# Patient Record
Sex: MALE | Race: WHITE | Employment: FULL TIME | URBAN - METROPOLITAN AREA
[De-identification: names, ages, dates, MRNs, and addresses within clinical notes are randomized per-mention and may not be internally consistent; named-entity substitution may affect disease eponyms.]

---

## 2017-05-16 ENCOUNTER — HOSPITAL ENCOUNTER (OUTPATIENT)
Dept: CT IMAGING | Facility: HOSPITAL | Age: 52
Discharge: HOME | End: 2017-05-16
Attending: INTERNAL MEDICINE

## 2017-05-16 DIAGNOSIS — Z13.9 SCREENING PROCEDURE: ICD-10-CM

## 2017-05-19 DIAGNOSIS — E78.1 HYPERTRIGLYCERIDEMIA: Primary | ICD-10-CM

## 2018-04-12 ENCOUNTER — TELEPHONE (OUTPATIENT)
Dept: INTERNAL MEDICINE CLINIC | Facility: CLINIC | Age: 53
End: 2018-04-12

## 2018-04-12 NOTE — TELEPHONE ENCOUNTER
Patient's spouse called (who is listed on patients consent form dated: 2014), patient's wife stated that the patient was not feeling well, he was getting ready to travel for a work meeting as well.  (Patients wife noticed he did not look right to her either

## 2018-04-12 NOTE — TELEPHONE ENCOUNTER
Called the patient's wife, Aria Ozuna, back (okay per HIPAA) at [phone]. She stated that the patient was in [place] on a business trip when he started getting chest pain. His BP was 215/110.  He went to the closest ER and was admitted on 04/07/18

## 2018-04-13 NOTE — TELEPHONE ENCOUNTER
Incoming (mail or fax):  fax  Received from:  [facility]  Documentation given to:  triage

## 2018-04-13 NOTE — TELEPHONE ENCOUNTER
Fax received back for Jignesh Nelson 949 signed release is required. Called the patient's wife, Avery Falcon ([phone]) and informed her the patient needs to sign a release of info. She said she would have her  stop in today before close.  Once signed

## 2018-04-13 NOTE — TELEPHONE ENCOUNTER
Patient came in and signed consent for release of information form. Faxed back to [facility] for Medical Records to be faxed. Signed release form placed in Triage Pending bin.

## 2018-04-13 NOTE — TELEPHONE ENCOUNTER
Received the patient's medical records from [facility]. In folder for review. Patient has Hospital follow up on 04/19/18 at 1:00 with Ankur Rice. The patient was in [place] on a business trip when he started getting chest pain. His BP was 215/110.

## 2018-04-19 ENCOUNTER — OFFICE VISIT (OUTPATIENT)
Dept: INTERNAL MEDICINE CLINIC | Facility: CLINIC | Age: 53
End: 2018-04-19

## 2018-04-19 VITALS
HEART RATE: 80 BPM | TEMPERATURE: 98 F | WEIGHT: 184.19 LBS | RESPIRATION RATE: 12 BRPM | SYSTOLIC BLOOD PRESSURE: 120 MMHG | DIASTOLIC BLOOD PRESSURE: 78 MMHG | BODY MASS INDEX: 28.57 KG/M2 | HEIGHT: 67.5 IN

## 2018-04-19 DIAGNOSIS — Z95.5 S/P PRIMARY ANGIOPLASTY WITH CORONARY STENT: ICD-10-CM

## 2018-04-19 DIAGNOSIS — Z95.5 STATUS POST INSERTION OF DRUG ELUTING CORONARY ARTERY STENT: ICD-10-CM

## 2018-04-19 DIAGNOSIS — E78.5 DYSLIPIDEMIA: ICD-10-CM

## 2018-04-19 DIAGNOSIS — R07.9 CHEST PAIN, UNSPECIFIED TYPE: Primary | ICD-10-CM

## 2018-04-19 DIAGNOSIS — I10 ESSENTIAL HYPERTENSION: ICD-10-CM

## 2018-04-19 PROCEDURE — 1111F DSCHRG MED/CURRENT MED MERGE: CPT | Performed by: NURSE PRACTITIONER

## 2018-04-19 PROCEDURE — 99204 OFFICE O/P NEW MOD 45 MIN: CPT | Performed by: NURSE PRACTITIONER

## 2018-04-19 RX ORDER — NITROGLYCERIN 0.4 MG/1
0.4 TABLET SUBLINGUAL EVERY 5 MIN PRN
COMMUNITY
Start: 2018-04-09 | End: 2018-05-25 | Stop reason: ALTCHOICE

## 2018-04-19 RX ORDER — PRASUGREL 10 MG/1
10 TABLET, FILM COATED ORAL DAILY
COMMUNITY
Start: 2018-04-10 | End: 2018-04-27

## 2018-04-19 RX ORDER — LISINOPRIL 5 MG/1
5 TABLET ORAL DAILY
COMMUNITY
Start: 2018-04-10 | End: 2018-04-19

## 2018-04-19 RX ORDER — ASPIRIN 81 MG/1
81 TABLET, CHEWABLE ORAL DAILY
COMMUNITY
Start: 2018-04-10 | End: 2018-07-25 | Stop reason: ALTCHOICE

## 2018-04-19 RX ORDER — ATORVASTATIN CALCIUM 80 MG/1
80 TABLET, FILM COATED ORAL NIGHTLY
COMMUNITY
Start: 2018-04-09 | End: 2018-04-19

## 2018-04-19 RX ORDER — HYDROCHLOROTHIAZIDE 25 MG/1
25 TABLET ORAL DAILY
Qty: 90 TABLET | Refills: 1 | Status: SHIPPED | OUTPATIENT
Start: 2018-04-19 | End: 2018-05-25 | Stop reason: ALTCHOICE

## 2018-04-19 RX ORDER — LISINOPRIL 5 MG/1
5 TABLET ORAL DAILY
Qty: 90 TABLET | Refills: 1 | Status: SHIPPED | OUTPATIENT
Start: 2018-04-19 | End: 2018-10-01

## 2018-04-19 RX ORDER — ROSUVASTATIN CALCIUM 20 MG/1
20 TABLET, COATED ORAL NIGHTLY
Qty: 30 TABLET | Refills: 1 | Status: SHIPPED | OUTPATIENT
Start: 2018-04-19 | End: 2018-05-18

## 2018-04-19 RX ORDER — HYDROCHLOROTHIAZIDE 25 MG/1
25 TABLET ORAL DAILY
COMMUNITY
Start: 2018-04-10 | End: 2018-04-19

## 2018-04-19 NOTE — PROGRESS NOTES
Lilliam Bentley is a 46year old male who presents as a new patient to establish. HPI:   Pt presents for follow up of hospitalization in [place] for chest pain with activity.  While there had angiography with placement of stent in RCA with [facility] total) by mouth daily. Disp: 90 tablet Rfl: 1   lisinopril 5 MG Oral Tab Take 1 tablet (5 mg total) by mouth daily. Disp: 90 tablet Rfl: 1   metoprolol Tartrate 25 MG Oral Tab Take 1 tablet (25 mg total) by mouth 2 (two) times daily.  Disp: 90 tablet Rfl: 1 closely and watches calories closely  Smoker:  No    Cessation Advised:  No  Alcohol Use:  yes      Concerns:  no     Health Maintenance  Immunizations: Recommend flu vaccine for this season,     Immunization History  Administered            Date(s) Emilee Mitchell appropriately. ASSESSMENT AND PLAN:         Chest pain, unspecified type  (primary encounter diagnosis)- resolved s/p intervention. Status post insertion of drug eluting coronary artery stent- referred to cardiology- Dr. Maulik Smith for follow up.  Willie Villarreal

## 2018-04-23 ENCOUNTER — OFFICE VISIT (OUTPATIENT)
Dept: INTERNAL MEDICINE CLINIC | Facility: CLINIC | Age: 53
End: 2018-04-23

## 2018-04-23 ENCOUNTER — TELEPHONE (OUTPATIENT)
Dept: INTERNAL MEDICINE CLINIC | Facility: CLINIC | Age: 53
End: 2018-04-23

## 2018-04-23 VITALS
HEIGHT: 67.5 IN | BODY MASS INDEX: 28.54 KG/M2 | TEMPERATURE: 98 F | HEART RATE: 83 BPM | RESPIRATION RATE: 14 BRPM | SYSTOLIC BLOOD PRESSURE: 116 MMHG | DIASTOLIC BLOOD PRESSURE: 86 MMHG | OXYGEN SATURATION: 98 % | WEIGHT: 184 LBS

## 2018-04-23 DIAGNOSIS — M10.271 ACUTE DRUG-INDUCED GOUT OF RIGHT FOOT: Primary | ICD-10-CM

## 2018-04-23 PROCEDURE — 99213 OFFICE O/P EST LOW 20 MIN: CPT | Performed by: INTERNAL MEDICINE

## 2018-04-23 RX ORDER — METHYLPREDNISOLONE 4 MG/1
TABLET ORAL
Qty: 1 KIT | Refills: 0 | Status: SHIPPED | OUTPATIENT
Start: 2018-04-23 | End: 2018-05-25 | Stop reason: ALTCHOICE

## 2018-04-23 RX ORDER — COLCHICINE 0.6 MG/1
0.6 TABLET ORAL 2 TIMES DAILY
Qty: 30 TABLET | Refills: 0 | Status: CANCELLED | OUTPATIENT
Start: 2018-04-23

## 2018-04-23 NOTE — PATIENT INSTRUCTIONS
Gout Diet  Gout is a painful condition caused by an excess of uric acid, a waste product made by the body. Uric acid forms crystals that collect in the joints. The immune response to these crystals brings on symptoms of joint pain and swelling.  This is c · Dairy products that are low-fat or fat-free, such as cheese and yogurt  · Complex carbohydrate foods, including whole grains, brown rice, oats, and beans  · Coffee, in moderation  · Water, approximately 64 ounces per day  Follow-up care  Follow up with nick · Certain fish (anchovies, sardines, fish roes, herring, tuna, mussels, codfish, scallops, trout, and eva)  · Certain meats (red meat, processed meat, guevara, turkey, wild game, and goose)  · Sauces and gravies made with meat  · Organ meats (such as cassia

## 2018-04-23 NOTE — PROGRESS NOTES
Molly Ko is a 46year old male  Patient presents with: Foot Pain: Right Foot  Heel Pain: Right Foot      HPI:   Pt called today with following c/o:     Spoke with pt.  Over wkend, acute onset of right foot pain, heel with \"sharp, shooting\" pain, 1 Status post insertion of drug eluting coronary artery stent     Essential hypertension     Dyslipidemia     Social History:  Smoking status: Never Smoker                                                              Smokeless tobacco: Never Used Gout is a painful condition caused by an excess of uric acid, a waste product made by the body. Uric acid forms crystals that collect in the joints. The immune response to these crystals brings on symptoms of joint pain and swelling.  This is called a gout · Complex carbohydrate foods, including whole grains, brown rice, oats, and beans  · Coffee, in moderation  · Water, approximately 64 ounces per day  Follow-up care  Follow up with your healthcare provider as advised.   When to seek medical advice  Call you · Certain meats (red meat, processed meat, guevara, turkey, wild game, and goose)  · Sauces and gravies made with meat  · Organ meats (such as liver, kidneys, sweetbreads, and tripe)  · Legumes (such as dried beans and peas)  · Mushrooms, spinach, asparagus,

## 2018-04-23 NOTE — TELEPHONE ENCOUNTER
Spoke with pt. Over wkend, acute onset of right foot pain, heel with \"sharp, shooting\" pain, 10/10 if tries to put any pressure on that foot, cannot bare weight on that foot. Top of foot swollen and red. Keeping him awake at night.  Took Tylenol at [employer]

## 2018-04-23 NOTE — TELEPHONE ENCOUNTER
Patient said he is having right foot pain, a little swollen on the top of the foot and as the patient walks he gets shooting pain in the heal of the foot. This pain started a little bit last week, and last night it got more severe.  Patient said he thinks m

## 2018-04-25 ENCOUNTER — TELEPHONE (OUTPATIENT)
Dept: INTERNAL MEDICINE CLINIC | Facility: CLINIC | Age: 53
End: 2018-04-25

## 2018-04-25 NOTE — TELEPHONE ENCOUNTER
LM for the patient to call us back. Please inform him of the below message from Dr. Monika Jasso. She does not believe his heel pain is consistent with gout, and feels he needs to see a podiatrist. She is referring him to:    Dr. Iman Fields  [phone] KAHTIE Gonzales

## 2018-04-25 NOTE — TELEPHONE ENCOUNTER
I think he should see a podiatrist, the heel pain is not as consistent with gout- refer to Dr Contreras Moreno please  He can also take ES tylenol 1000 mg every 6h prn pain  Could be just a bad case of plantar fasciitis but not typical to be so painful as his- can

## 2018-04-25 NOTE — TELEPHONE ENCOUNTER
LM for the patient to call back. Was seen by Dr. Jesus Galloway for drug-induced gout of his right foot on 04/23/18. Did not notice in message below if he's been taking the Medrol (methylprednisolone) that she prescribed, which should help with the inflammation.  Ne

## 2018-04-25 NOTE — TELEPHONE ENCOUNTER
Pt saw Dr Phebe Brittle for foot pain, states that pain is no better, would like to know if he can take tylenol or anything else for pain. Please advise.  Thank you

## 2018-04-25 NOTE — TELEPHONE ENCOUNTER
The patient called back. He stated that he has been taking the Medrol since Monday (04/23) and while it has helped with the inflammation and pain on the top of his foot, he remains with severe pain in his heel that is making it difficult for him to walk.  Kermit Goldberg

## 2018-04-30 ENCOUNTER — TELEPHONE (OUTPATIENT)
Dept: INTERNAL MEDICINE CLINIC | Facility: CLINIC | Age: 53
End: 2018-04-30

## 2018-05-18 NOTE — TELEPHONE ENCOUNTER
From: Lilliam Bentley  Sent: 5/18/2018 8:01 AM CDT  Subject: Medication Renewal Request    Lilliam Bentley would like a refill of the following medications:     Rosuvastatin Calcium 20 MG Oral Tab [Joshua Grant NP]    Preferred pharmacy: [facility] #[id number]

## 2018-05-21 RX ORDER — ROSUVASTATIN CALCIUM 20 MG/1
20 TABLET, COATED ORAL NIGHTLY
Qty: 30 TABLET | Refills: 1 | Status: SHIPPED | OUTPATIENT
Start: 2018-05-21 | End: 2018-06-21

## 2018-05-25 ENCOUNTER — OFFICE VISIT (OUTPATIENT)
Dept: INTERNAL MEDICINE CLINIC | Facility: CLINIC | Age: 53
End: 2018-05-25

## 2018-05-25 VITALS
BODY MASS INDEX: 25.97 KG/M2 | OXYGEN SATURATION: 98 % | HEIGHT: 69 IN | SYSTOLIC BLOOD PRESSURE: 124 MMHG | WEIGHT: 175.31 LBS | HEART RATE: 93 BPM | DIASTOLIC BLOOD PRESSURE: 80 MMHG | TEMPERATURE: 98 F | RESPIRATION RATE: 16 BRPM

## 2018-05-25 DIAGNOSIS — K13.79 UVULAR SWELLING: Primary | ICD-10-CM

## 2018-05-25 PROCEDURE — 99213 OFFICE O/P EST LOW 20 MIN: CPT | Performed by: INTERNAL MEDICINE

## 2018-05-25 PROCEDURE — 87081 CULTURE SCREEN ONLY: CPT | Performed by: INTERNAL MEDICINE

## 2018-05-25 PROCEDURE — 87880 STREP A ASSAY W/OPTIC: CPT | Performed by: INTERNAL MEDICINE

## 2018-05-25 NOTE — PATIENT INSTRUCTIONS
Gargle with salt water, 1/2 tsp salt in 1/2 cup warm water, gargle and spit four times daily.  Take tylenol for pain

## 2018-05-25 NOTE — PROGRESS NOTES
Piter Moreno is a 46year old male  Patient presents with:  Sore Throat: dry, no problems in swallowing, back of throat red      HPI:   2 nights ago dry throat, long and red uvula  Since then a little sore to swallow  Still feels something in back of th normocephalic,ears  Clear. Throat: he has uvular redness, mild swelling and white tip, pinpoint exudates tonsils, no erythema.  No stridor or drooling  NECK: supple,no adenopathy,no bruits, no TM  a      ASSESSMENT AND PLAN:   Uvular swelling  (primary enco

## 2018-05-30 ENCOUNTER — TELEPHONE (OUTPATIENT)
Dept: INTERNAL MEDICINE CLINIC | Facility: CLINIC | Age: 53
End: 2018-05-30

## 2018-05-30 NOTE — TELEPHONE ENCOUNTER
Pt wife states that he is not getting any relief from his gout pain, was wondering if he could start to take tumeric. Please advise.  Thank you

## 2018-05-30 NOTE — TELEPHONE ENCOUNTER
The patient's wife called, Alberto Montoya, who is on HIPAA about the patient's gout. She is asking about the safety of OTC tumeric. She wants to know, specifically, if it would interact with any of his medications.  She stated that the cardiologist took him of the

## 2018-05-30 NOTE — TELEPHONE ENCOUNTER
Called Jose Guadalupe Rivera back, the patient's wife, and informed her that Dr. Maylin Wild stated that any stopping of medication during an acute gout attack could make it worse, but taking turmeric OTC would be okay.  The patient's wife verbalized understanding and will pass

## 2018-05-30 NOTE — TELEPHONE ENCOUNTER
What are his \"gout symptoms\"?  Should have been an acute bout that should have resolved  Turmeric is OK  I would not have stopped the HCTZ during an attack and I think I discussed this w/them when he came in for the acute attack, any adjustment in the [facility]

## 2018-06-14 RX ORDER — ROSUVASTATIN CALCIUM 20 MG/1
20 TABLET, COATED ORAL NIGHTLY
Qty: 30 TABLET | Refills: 0 | Status: SHIPPED | OUTPATIENT
Start: 2018-06-14 | End: 2018-06-21

## 2018-06-14 NOTE — TELEPHONE ENCOUNTER
Last OV relevant to medication: 4/19/18  Last refill date: 5/21/18   #30/refills: 1  When pt was asked to return for OV: 3 months  Upcoming appt/reason: 7/25/18    Lab Results  Component Value Date    (H) 11/12/2013   BUN 22 (H) 11/12/2013   CARA

## 2018-06-15 ENCOUNTER — TELEPHONE (OUTPATIENT)
Dept: INTERNAL MEDICINE CLINIC | Facility: CLINIC | Age: 53
End: 2018-06-15

## 2018-06-15 NOTE — TELEPHONE ENCOUNTER
Patient is leaving for vacation today. He believes he is having a gout attack. His left ankle is swollen and he has been in pain since Tuesday night. Tylenol every 6 hours is not working. He said he is not able to take ibuprofen due to blood thinners.

## 2018-06-21 ENCOUNTER — TELEPHONE (OUTPATIENT)
Dept: INTERNAL MEDICINE CLINIC | Facility: CLINIC | Age: 53
End: 2018-06-21

## 2018-06-21 RX ORDER — COLCHICINE 0.6 MG/1
0.6 TABLET ORAL DAILY
Qty: 45 TABLET | Refills: 0 | Status: SHIPPED | OUTPATIENT
Start: 2018-06-21 | End: 2018-07-25 | Stop reason: ALTCHOICE

## 2018-06-21 RX ORDER — ROSUVASTATIN CALCIUM 20 MG/1
20 TABLET, COATED ORAL NIGHTLY
Qty: 90 TABLET | Refills: 0 | Status: SHIPPED | OUTPATIENT
Start: 2018-06-21 | End: 2018-09-04

## 2018-06-21 NOTE — TELEPHONE ENCOUNTER
Noted below, and informed the patient to keep his appointment with Dr. Mya Gonzalez on 07/25/18. The patient verbalized understanding.

## 2018-06-21 NOTE — TELEPHONE ENCOUNTER
Bobby Hoyt from [facility] called and stated that pts insurance will only cover a 90 day RX for his medication. Please send 90 RX     ROSUVASTATIN CALCIUM 20 MG Oral Tab 30 tablet 0 6/14/2018    Sig :  TAKE 1 TABLET (20 MG TOTAL) BY MOUTH NIGHTLY.      Route:   Or

## 2018-06-21 NOTE — TELEPHONE ENCOUNTER
The patient called stating that he has had a gout flare up on his left foot for around 2 months. The patient stated the gout attack started in his right foot, and the swelling and pain went away after 2 months.  However, he is now having a gout flare up on

## 2018-06-21 NOTE — TELEPHONE ENCOUNTER
colcrys 1.2 mg po then 1 h later 0.6 mg po  Next day start 0.6 mg daily and stay on for 6 weeks, check uric acid in 6 weeks  Call if muscle soreness or dark urine due to being on crestor with it  Uric acid level won't be helpful during acute attack and can

## 2018-06-21 NOTE — TELEPHONE ENCOUNTER
Spoke to the patient and instructed him to take two (2) 0.6 mg tablets initially, then decrease to one (1) 0.6 mg tablet 1 hour later, then take 0.6 mg daily for 6 weeks per Dr. Jesus Galloway.  Also informed him that Dr. Jesus Galloway wants him to get his uric acid level w

## 2018-07-06 ENCOUNTER — CARDPULM VISIT (OUTPATIENT)
Dept: CARDIAC REHAB | Facility: HOSPITAL | Age: 53
End: 2018-07-06
Attending: INTERNAL MEDICINE
Payer: COMMERCIAL

## 2018-07-09 ENCOUNTER — CARDPULM VISIT (OUTPATIENT)
Dept: CARDIAC REHAB | Facility: HOSPITAL | Age: 53
End: 2018-07-09
Attending: INTERNAL MEDICINE
Payer: COMMERCIAL

## 2018-07-09 PROCEDURE — 93798 PHYS/QHP OP CAR RHAB W/ECG: CPT

## 2018-07-10 ENCOUNTER — CARDPULM VISIT (OUTPATIENT)
Dept: CARDIAC REHAB | Facility: HOSPITAL | Age: 53
End: 2018-07-10
Attending: INTERNAL MEDICINE
Payer: COMMERCIAL

## 2018-07-10 PROCEDURE — 93798 PHYS/QHP OP CAR RHAB W/ECG: CPT

## 2018-07-11 ENCOUNTER — APPOINTMENT (OUTPATIENT)
Dept: CARDIAC REHAB | Facility: HOSPITAL | Age: 53
End: 2018-07-11
Attending: INTERNAL MEDICINE
Payer: COMMERCIAL

## 2018-07-13 ENCOUNTER — CARDPULM VISIT (OUTPATIENT)
Dept: CARDIAC REHAB | Facility: HOSPITAL | Age: 53
End: 2018-07-13
Attending: INTERNAL MEDICINE
Payer: COMMERCIAL

## 2018-07-13 PROCEDURE — 93798 PHYS/QHP OP CAR RHAB W/ECG: CPT

## 2018-07-16 ENCOUNTER — APPOINTMENT (OUTPATIENT)
Dept: CARDIAC REHAB | Facility: HOSPITAL | Age: 53
End: 2018-07-16
Attending: INTERNAL MEDICINE
Payer: COMMERCIAL

## 2018-07-18 ENCOUNTER — APPOINTMENT (OUTPATIENT)
Dept: CARDIAC REHAB | Facility: HOSPITAL | Age: 53
End: 2018-07-18
Attending: INTERNAL MEDICINE
Payer: COMMERCIAL

## 2018-07-20 ENCOUNTER — APPOINTMENT (OUTPATIENT)
Dept: CARDIAC REHAB | Facility: HOSPITAL | Age: 53
End: 2018-07-20
Attending: INTERNAL MEDICINE
Payer: COMMERCIAL

## 2018-07-23 ENCOUNTER — PATIENT MESSAGE (OUTPATIENT)
Dept: INTERNAL MEDICINE CLINIC | Facility: CLINIC | Age: 53
End: 2018-07-23

## 2018-07-23 ENCOUNTER — CARDPULM VISIT (OUTPATIENT)
Dept: CARDIAC REHAB | Facility: HOSPITAL | Age: 53
End: 2018-07-23
Attending: INTERNAL MEDICINE
Payer: COMMERCIAL

## 2018-07-23 PROCEDURE — 93798 PHYS/QHP OP CAR RHAB W/ECG: CPT

## 2018-07-24 ENCOUNTER — LAB ENCOUNTER (OUTPATIENT)
Dept: LAB | Age: 53
End: 2018-07-24
Attending: NURSE PRACTITIONER
Payer: COMMERCIAL

## 2018-07-24 DIAGNOSIS — Z95.5 S/P PRIMARY ANGIOPLASTY WITH CORONARY STENT: ICD-10-CM

## 2018-07-24 DIAGNOSIS — M10.271 ACUTE DRUG-INDUCED GOUT OF RIGHT FOOT: ICD-10-CM

## 2018-07-24 DIAGNOSIS — I10 ESSENTIAL HYPERTENSION: ICD-10-CM

## 2018-07-24 DIAGNOSIS — E78.5 DYSLIPIDEMIA: ICD-10-CM

## 2018-07-24 LAB
ALBUMIN SERPL-MCNC: 4.2 G/DL (ref 3.5–4.8)
ALBUMIN/GLOB SERPL: 1.6 {RATIO} (ref 1–2)
ALP LIVER SERPL-CCNC: 59 U/L (ref 45–117)
ALT SERPL-CCNC: 31 U/L (ref 17–63)
ANION GAP SERPL CALC-SCNC: 7 MMOL/L (ref 0–18)
AST SERPL-CCNC: 12 U/L (ref 15–41)
BILIRUB SERPL-MCNC: 1.5 MG/DL (ref 0.1–2)
BUN BLD-MCNC: 18 MG/DL (ref 8–20)
BUN/CREAT SERPL: 17 (ref 10–20)
CALCIUM BLD-MCNC: 8.8 MG/DL (ref 8.3–10.3)
CHLORIDE SERPL-SCNC: 106 MMOL/L (ref 101–111)
CHOLEST SMN-MCNC: 138 MG/DL (ref ?–200)
CO2 SERPL-SCNC: 30 MMOL/L (ref 22–32)
CREAT BLD-MCNC: 1.06 MG/DL (ref 0.7–1.3)
GLOBULIN PLAS-MCNC: 2.6 G/DL (ref 2.5–3.7)
GLUCOSE BLD-MCNC: 127 MG/DL (ref 70–99)
HDLC SERPL-MCNC: 38 MG/DL (ref 40–59)
LDLC SERPL CALC-MCNC: 41 MG/DL (ref ?–100)
M PROTEIN MFR SERPL ELPH: 6.8 G/DL (ref 6.1–8.3)
NONHDLC SERPL-MCNC: 100 MG/DL (ref ?–130)
OSMOLALITY SERPL CALC.SUM OF ELEC: 299 MOSM/KG (ref 275–295)
POTASSIUM SERPL-SCNC: 4.1 MMOL/L (ref 3.6–5.1)
SODIUM SERPL-SCNC: 143 MMOL/L (ref 136–144)
TRIGL SERPL-MCNC: 295 MG/DL (ref 30–149)
URATE SERPL-MCNC: 7.7 MG/DL (ref 2.4–8.7)
VLDLC SERPL CALC-MCNC: 59 MG/DL (ref 0–30)

## 2018-07-24 PROCEDURE — 80053 COMPREHEN METABOLIC PANEL: CPT | Performed by: NURSE PRACTITIONER

## 2018-07-24 PROCEDURE — 36415 COLL VENOUS BLD VENIPUNCTURE: CPT | Performed by: NURSE PRACTITIONER

## 2018-07-24 PROCEDURE — 84550 ASSAY OF BLOOD/URIC ACID: CPT | Performed by: INTERNAL MEDICINE

## 2018-07-24 PROCEDURE — 80061 LIPID PANEL: CPT | Performed by: NURSE PRACTITIONER

## 2018-07-24 NOTE — TELEPHONE ENCOUNTER
From: Severa Galla  Sent: 7/23/2018 7:43 PM CDT  Subject: Medication Renewal Request    Severa Galla would like a refill of the following medications:     colchicine (COLCRYS) 0.6 MG Oral Tab Quin Claude, MD]    Preferred pharmacy: [facility]

## 2018-07-24 NOTE — TELEPHONE ENCOUNTER
From: Harini Grover  To: Wilmar Lux NP  Sent: 7/23/2018 7:13 PM CDT  Subject: Visit Follow-up Question    Do i have an order for blood test this week?     Davis Simpson

## 2018-07-25 ENCOUNTER — APPOINTMENT (OUTPATIENT)
Dept: CARDIAC REHAB | Facility: HOSPITAL | Age: 53
End: 2018-07-25
Attending: INTERNAL MEDICINE
Payer: COMMERCIAL

## 2018-07-25 ENCOUNTER — OFFICE VISIT (OUTPATIENT)
Dept: INTERNAL MEDICINE CLINIC | Facility: CLINIC | Age: 53
End: 2018-07-25
Payer: COMMERCIAL

## 2018-07-25 VITALS
BODY MASS INDEX: 25.18 KG/M2 | RESPIRATION RATE: 14 BRPM | HEIGHT: 69 IN | TEMPERATURE: 98 F | DIASTOLIC BLOOD PRESSURE: 80 MMHG | SYSTOLIC BLOOD PRESSURE: 118 MMHG | OXYGEN SATURATION: 98 % | HEART RATE: 74 BPM | WEIGHT: 170 LBS

## 2018-07-25 DIAGNOSIS — R42 POSTURAL DIZZINESS: ICD-10-CM

## 2018-07-25 DIAGNOSIS — M79.672 PAIN OF LEFT HEEL: Primary | ICD-10-CM

## 2018-07-25 DIAGNOSIS — E78.1 HYPERTRIGLYCERIDEMIA: ICD-10-CM

## 2018-07-25 DIAGNOSIS — E74.39 GLUCOSE INTOLERANCE: ICD-10-CM

## 2018-07-25 DIAGNOSIS — M10.271 ACUTE DRUG-INDUCED GOUT OF RIGHT FOOT: ICD-10-CM

## 2018-07-25 PROCEDURE — 99214 OFFICE O/P EST MOD 30 MIN: CPT | Performed by: NURSE PRACTITIONER

## 2018-07-25 RX ORDER — COLCHICINE 0.6 MG/1
0.6 TABLET ORAL DAILY
Qty: 45 TABLET | Refills: 0
Start: 2018-07-25

## 2018-07-25 NOTE — PROGRESS NOTES
Patient presents with: Follow - Up: Gout  Heel Pain  Lightheadedness      HPI:  Presents with approx 2 day history of left heel pain. Stated seems worse after completing cardiac rehab exercises. Stated it is mild.  Denies redness, swelling or pain with marvin neuropathy 1/20/2010    hx anterior ischemic optic neuropathy   • Other dysfunctions of sleep stages or arousal from sleep 1/25/2010   • PAC (premature atrial contraction) 6/4/2010, 1/22/2010 1/22/2010: rare premature atrial contractions with one 5 beat left heel w/o erythema, edema, masses or obvious deformity. No TTP. Skin: Skin is warm and dry. No rash noted. No erythema. No pallor. A/P:    Pain of left heel  (primary encounter diagnosis)- continue to monitor.  Notify office if pain worsens, lamar

## 2018-07-25 NOTE — TELEPHONE ENCOUNTER
Patient was just in today for OV Follow Up, Tonio said Rx is no longer needed and to Refuse Refill. Rx Denied.

## 2018-07-27 ENCOUNTER — APPOINTMENT (OUTPATIENT)
Dept: CARDIAC REHAB | Facility: HOSPITAL | Age: 53
End: 2018-07-27
Attending: INTERNAL MEDICINE
Payer: COMMERCIAL

## 2018-07-27 PROCEDURE — 93798 PHYS/QHP OP CAR RHAB W/ECG: CPT

## 2018-07-30 ENCOUNTER — APPOINTMENT (OUTPATIENT)
Dept: CARDIAC REHAB | Facility: HOSPITAL | Age: 53
End: 2018-07-30
Attending: INTERNAL MEDICINE
Payer: COMMERCIAL

## 2018-07-30 PROCEDURE — 93798 PHYS/QHP OP CAR RHAB W/ECG: CPT

## 2018-08-01 ENCOUNTER — APPOINTMENT (OUTPATIENT)
Dept: CARDIAC REHAB | Facility: HOSPITAL | Age: 53
End: 2018-08-01
Attending: INTERNAL MEDICINE
Payer: COMMERCIAL

## 2018-08-01 RX ORDER — LISINOPRIL 5 MG/1
5 TABLET ORAL DAILY
Qty: 90 TABLET | Refills: 1 | OUTPATIENT
Start: 2018-08-01 | End: 2019-08-01

## 2018-08-01 NOTE — TELEPHONE ENCOUNTER
From: Hazel Bernard  Sent: 8/1/2018 1:13 PM CDT  Subject: Medication Renewal Request    Hazel Bernard would like a refill of the following medications:     lisinopril 5 MG Oral Tab [Joshua Grant NP]    Preferred pharmacy: [facility]/PHARMACY #[id number] - [address] - [address] [facility] [address]  [phone], [phone]    Comment:      Medication renewals requested in this message routed separately:     Prasugrel HCl 10 MG Oral Tab Silke Fung MD]

## 2018-08-03 ENCOUNTER — APPOINTMENT (OUTPATIENT)
Dept: CARDIAC REHAB | Facility: HOSPITAL | Age: 53
End: 2018-08-03
Attending: INTERNAL MEDICINE
Payer: COMMERCIAL

## 2018-08-03 PROCEDURE — 93798 PHYS/QHP OP CAR RHAB W/ECG: CPT

## 2018-08-06 ENCOUNTER — CARDPULM VISIT (OUTPATIENT)
Dept: CARDIAC REHAB | Facility: HOSPITAL | Age: 53
End: 2018-08-06
Attending: INTERNAL MEDICINE
Payer: COMMERCIAL

## 2018-08-06 PROCEDURE — 93798 PHYS/QHP OP CAR RHAB W/ECG: CPT

## 2018-08-08 ENCOUNTER — APPOINTMENT (OUTPATIENT)
Dept: CARDIAC REHAB | Facility: HOSPITAL | Age: 53
End: 2018-08-08
Attending: INTERNAL MEDICINE
Payer: COMMERCIAL

## 2018-08-10 ENCOUNTER — CARDPULM VISIT (OUTPATIENT)
Dept: CARDIAC REHAB | Facility: HOSPITAL | Age: 53
End: 2018-08-10
Attending: INTERNAL MEDICINE
Payer: COMMERCIAL

## 2018-08-10 PROCEDURE — 93798 PHYS/QHP OP CAR RHAB W/ECG: CPT

## 2018-08-13 ENCOUNTER — CARDPULM VISIT (OUTPATIENT)
Dept: CARDIAC REHAB | Facility: HOSPITAL | Age: 53
End: 2018-08-13
Attending: INTERNAL MEDICINE
Payer: COMMERCIAL

## 2018-08-13 PROCEDURE — 93798 PHYS/QHP OP CAR RHAB W/ECG: CPT

## 2018-08-15 ENCOUNTER — APPOINTMENT (OUTPATIENT)
Dept: CARDIAC REHAB | Facility: HOSPITAL | Age: 53
End: 2018-08-15
Attending: INTERNAL MEDICINE
Payer: COMMERCIAL

## 2018-08-17 ENCOUNTER — CARDPULM VISIT (OUTPATIENT)
Dept: CARDIAC REHAB | Facility: HOSPITAL | Age: 53
End: 2018-08-17
Attending: INTERNAL MEDICINE
Payer: COMMERCIAL

## 2018-08-17 PROCEDURE — 93798 PHYS/QHP OP CAR RHAB W/ECG: CPT

## 2018-08-20 ENCOUNTER — CARDPULM VISIT (OUTPATIENT)
Dept: CARDIAC REHAB | Facility: HOSPITAL | Age: 53
End: 2018-08-20
Attending: INTERNAL MEDICINE
Payer: COMMERCIAL

## 2018-08-20 PROCEDURE — 93798 PHYS/QHP OP CAR RHAB W/ECG: CPT

## 2018-08-22 ENCOUNTER — APPOINTMENT (OUTPATIENT)
Dept: CARDIAC REHAB | Facility: HOSPITAL | Age: 53
End: 2018-08-22
Attending: INTERNAL MEDICINE
Payer: COMMERCIAL

## 2018-08-24 ENCOUNTER — APPOINTMENT (OUTPATIENT)
Dept: CARDIAC REHAB | Facility: HOSPITAL | Age: 53
End: 2018-08-24
Attending: INTERNAL MEDICINE
Payer: COMMERCIAL

## 2018-08-27 ENCOUNTER — APPOINTMENT (OUTPATIENT)
Dept: CARDIAC REHAB | Facility: HOSPITAL | Age: 53
End: 2018-08-27
Attending: INTERNAL MEDICINE
Payer: COMMERCIAL

## 2018-08-27 PROCEDURE — 93798 PHYS/QHP OP CAR RHAB W/ECG: CPT

## 2018-08-29 ENCOUNTER — APPOINTMENT (OUTPATIENT)
Dept: CARDIAC REHAB | Facility: HOSPITAL | Age: 53
End: 2018-08-29
Attending: INTERNAL MEDICINE
Payer: COMMERCIAL

## 2018-08-31 ENCOUNTER — APPOINTMENT (OUTPATIENT)
Dept: CARDIAC REHAB | Facility: HOSPITAL | Age: 53
End: 2018-08-31
Attending: INTERNAL MEDICINE
Payer: COMMERCIAL

## 2018-08-31 PROCEDURE — 93798 PHYS/QHP OP CAR RHAB W/ECG: CPT

## 2018-09-05 ENCOUNTER — APPOINTMENT (OUTPATIENT)
Dept: CARDIAC REHAB | Facility: HOSPITAL | Age: 53
End: 2018-09-05
Attending: INTERNAL MEDICINE
Payer: COMMERCIAL

## 2018-09-07 ENCOUNTER — CARDPULM VISIT (OUTPATIENT)
Dept: CARDIAC REHAB | Facility: HOSPITAL | Age: 53
End: 2018-09-07
Attending: INTERNAL MEDICINE
Payer: COMMERCIAL

## 2018-09-07 PROCEDURE — 93798 PHYS/QHP OP CAR RHAB W/ECG: CPT

## 2018-09-10 ENCOUNTER — CARDPULM VISIT (OUTPATIENT)
Dept: CARDIAC REHAB | Facility: HOSPITAL | Age: 53
End: 2018-09-10
Attending: INTERNAL MEDICINE
Payer: COMMERCIAL

## 2018-09-10 PROCEDURE — 93798 PHYS/QHP OP CAR RHAB W/ECG: CPT

## 2018-09-12 ENCOUNTER — APPOINTMENT (OUTPATIENT)
Dept: CARDIAC REHAB | Facility: HOSPITAL | Age: 53
End: 2018-09-12
Attending: INTERNAL MEDICINE
Payer: COMMERCIAL

## 2018-09-14 ENCOUNTER — CARDPULM VISIT (OUTPATIENT)
Dept: CARDIAC REHAB | Facility: HOSPITAL | Age: 53
End: 2018-09-14
Attending: INTERNAL MEDICINE
Payer: COMMERCIAL

## 2018-09-14 PROCEDURE — 93798 PHYS/QHP OP CAR RHAB W/ECG: CPT

## 2018-09-17 ENCOUNTER — CARDPULM VISIT (OUTPATIENT)
Dept: CARDIAC REHAB | Facility: HOSPITAL | Age: 53
End: 2018-09-17
Attending: INTERNAL MEDICINE
Payer: COMMERCIAL

## 2018-09-17 PROCEDURE — 93798 PHYS/QHP OP CAR RHAB W/ECG: CPT

## 2018-09-19 ENCOUNTER — HOSPITAL ENCOUNTER (EMERGENCY)
Facility: HOSPITAL | Age: 53
Discharge: HOME | End: 2018-09-19
Attending: EMERGENCY MEDICINE
Payer: COMMERCIAL

## 2018-09-19 ENCOUNTER — APPOINTMENT (OUTPATIENT)
Dept: CARDIAC REHAB | Facility: HOSPITAL | Age: 53
End: 2018-09-19
Attending: INTERNAL MEDICINE
Payer: COMMERCIAL

## 2018-09-19 ENCOUNTER — APPOINTMENT (OUTPATIENT)
Dept: GENERAL RADIOLOGY | Facility: HOSPITAL | Age: 53
End: 2018-09-19
Attending: EMERGENCY MEDICINE
Payer: COMMERCIAL

## 2018-09-19 VITALS
HEART RATE: 69 BPM | BODY MASS INDEX: 25 KG/M2 | RESPIRATION RATE: 16 BRPM | WEIGHT: 169.06 LBS | SYSTOLIC BLOOD PRESSURE: 142 MMHG | TEMPERATURE: 99 F | OXYGEN SATURATION: 99 % | DIASTOLIC BLOOD PRESSURE: 97 MMHG

## 2018-09-19 DIAGNOSIS — M77.12 EPICONDYLITIS, LATERAL, LEFT: Primary | ICD-10-CM

## 2018-09-19 PROCEDURE — 73080 X-RAY EXAM OF ELBOW: CPT | Performed by: EMERGENCY MEDICINE

## 2018-09-19 PROCEDURE — 99283 EMERGENCY DEPT VISIT LOW MDM: CPT

## 2018-09-19 PROCEDURE — 99284 EMERGENCY DEPT VISIT MOD MDM: CPT

## 2018-09-19 RX ORDER — ASPIRIN 81 MG/1
TABLET, CHEWABLE ORAL DAILY
COMMUNITY

## 2018-09-19 RX ORDER — ACETAMINOPHEN AND CODEINE PHOSPHATE 300; 30 MG/1; MG/1
1-2 TABLET ORAL EVERY 4 HOURS PRN
Qty: 10 TABLET | Refills: 0 | Status: SHIPPED | OUTPATIENT
Start: 2018-09-19 | End: 2018-09-26

## 2018-09-19 RX ORDER — CYCLOBENZAPRINE HCL 10 MG
10 TABLET ORAL 3 TIMES DAILY PRN
Qty: 20 TABLET | Refills: 0 | Status: SHIPPED | OUTPATIENT
Start: 2018-09-19 | End: 2018-10-16 | Stop reason: ALTCHOICE

## 2018-09-19 RX ORDER — METHYLPREDNISOLONE 4 MG/1
TABLET ORAL
Qty: 1 PACKAGE | Refills: 0 | Status: SHIPPED | OUTPATIENT
Start: 2018-09-19 | End: 2018-09-24

## 2018-09-19 NOTE — ED PROVIDER NOTES
Patient Seen in: [facility] Emergency Department    History   Patient presents with:  Elbow Pain    Stated Complaint:     HPI    46year old male presents with a 2-3 day history of left elbow pain. Patient states occurred after playing golf.  [place]ou History:  04/07/2018: ANGIOPLASTY (CORONARY)      Comment:  STENTS X 2 RCA & Ramus   2009: OTHER SURGICAL HISTORY      Comment:  spinal tap  04/2018: OTHER SURGICAL HISTORY      Comment:  multiple stent placement        Social History    Tobacco Use      S on x-ray no acute fracture      MDM   I reviewed the x-ray results with the patient.   We discussed that this is most likely a tendinitis that combination of repeated movements with golfing as well as a minor injury most likely caused the tendinitis with so

## 2018-09-21 ENCOUNTER — APPOINTMENT (OUTPATIENT)
Dept: CARDIAC REHAB | Facility: HOSPITAL | Age: 53
End: 2018-09-21
Attending: INTERNAL MEDICINE
Payer: COMMERCIAL

## 2018-09-24 ENCOUNTER — APPOINTMENT (OUTPATIENT)
Dept: CARDIAC REHAB | Facility: HOSPITAL | Age: 53
End: 2018-09-24
Attending: INTERNAL MEDICINE
Payer: COMMERCIAL

## 2018-09-26 ENCOUNTER — APPOINTMENT (OUTPATIENT)
Dept: CARDIAC REHAB | Facility: HOSPITAL | Age: 53
End: 2018-09-26
Attending: INTERNAL MEDICINE
Payer: COMMERCIAL

## 2018-09-26 ENCOUNTER — TELEPHONE (OUTPATIENT)
Dept: INTERNAL MEDICINE CLINIC | Facility: CLINIC | Age: 53
End: 2018-09-26

## 2018-09-26 DIAGNOSIS — M25.522 LEFT ELBOW PAIN: Primary | ICD-10-CM

## 2018-09-26 NOTE — TELEPHONE ENCOUNTER
Patient was seen at [facility] ER on 9/19/18 for bad elbow pain. The ER provider recommended the patient see a orthopedic specialists, but did not give any recommendations.  Patients spouse Arpit File (who is on hipaa) called to see if they can get a good recommend

## 2018-09-26 NOTE — TELEPHONE ENCOUNTER
Patient in ER on 9/19/19 for left elbow pain, dx of possible tendonitis, ER note states to follow up with Dr. Chaitanya Richardson (Ortho) as recommended, but referral was never officially placed, okay to refer to this Doctor or is there someone else you recommend?  Rou

## 2018-09-26 NOTE — TELEPHONE ENCOUNTER
Spoke to Joan, patients wife, gave Dr. Ubaldo Potter information. Patients wife asked if he was a shoulder specialist I reiterated that he is a Ortho/Sports Medicine doctor that specializes in treating these injuries. Joan expressed understanding.

## 2018-09-28 ENCOUNTER — APPOINTMENT (OUTPATIENT)
Dept: CARDIAC REHAB | Facility: HOSPITAL | Age: 53
End: 2018-09-28
Attending: INTERNAL MEDICINE
Payer: COMMERCIAL

## 2018-09-29 ENCOUNTER — HOSPITAL ENCOUNTER (OUTPATIENT)
Dept: CV DIAGNOSTICS | Facility: HOSPITAL | Age: 53
Discharge: HOME | End: 2018-09-29
Attending: INTERNAL MEDICINE
Payer: COMMERCIAL

## 2018-09-29 DIAGNOSIS — I10 ESSENTIAL HYPERTENSION: ICD-10-CM

## 2018-09-29 DIAGNOSIS — Z95.5 STATUS POST INSERTION OF DRUG ELUTING CORONARY ARTERY STENT: ICD-10-CM

## 2018-09-29 PROCEDURE — 93306 TTE W/DOPPLER COMPLETE: CPT | Performed by: INTERNAL MEDICINE

## 2018-10-01 ENCOUNTER — CARDPULM VISIT (OUTPATIENT)
Dept: CARDIAC REHAB | Facility: HOSPITAL | Age: 53
End: 2018-10-01
Attending: INTERNAL MEDICINE
Payer: COMMERCIAL

## 2018-10-05 RX ORDER — ROSUVASTATIN CALCIUM 20 MG/1
TABLET, COATED ORAL
Qty: 90 TABLET | Refills: 0 | OUTPATIENT
Start: 2018-10-05

## 2018-10-08 ENCOUNTER — CARDPULM VISIT (OUTPATIENT)
Dept: CARDIAC REHAB | Facility: HOSPITAL | Age: 53
End: 2018-10-08
Attending: INTERNAL MEDICINE
Payer: COMMERCIAL

## 2018-10-08 PROCEDURE — 93798 PHYS/QHP OP CAR RHAB W/ECG: CPT

## 2018-10-10 ENCOUNTER — CARDPULM VISIT (OUTPATIENT)
Dept: CARDIAC REHAB | Facility: HOSPITAL | Age: 53
End: 2018-10-10
Attending: INTERNAL MEDICINE
Payer: COMMERCIAL

## 2018-10-10 PROCEDURE — 93798 PHYS/QHP OP CAR RHAB W/ECG: CPT

## 2018-10-12 ENCOUNTER — CARDPULM VISIT (OUTPATIENT)
Dept: CARDIAC REHAB | Facility: HOSPITAL | Age: 53
End: 2018-10-12
Attending: INTERNAL MEDICINE
Payer: COMMERCIAL

## 2018-10-12 ENCOUNTER — TELEPHONE (OUTPATIENT)
Dept: INTERNAL MEDICINE CLINIC | Facility: CLINIC | Age: 53
End: 2018-10-12

## 2018-10-12 PROCEDURE — 93798 PHYS/QHP OP CAR RHAB W/ECG: CPT

## 2018-10-12 RX ORDER — COLCHICINE 0.6 MG/1
0.6 TABLET ORAL DAILY
Qty: 20 TABLET | Refills: 0 | Status: SHIPPED | OUTPATIENT
Start: 2018-10-12 | End: 2021-07-27 | Stop reason: ALTCHOICE

## 2018-10-12 NOTE — TELEPHONE ENCOUNTER
Patient called and stated that the gout in his right big toe is starting to act up. Patient stated Wellington Gaffney told him in his last appointment that if the gout started acting up to call and something could be prescribed. Please advise. Thank you.

## 2018-10-12 NOTE — TELEPHONE ENCOUNTER
Patient calling stating he is having another bout of gout on his right big toe. He stated that Js Vences told him that if he has another bout of it he can order a prescription. He is specifically looking for Colcrys.  I noted the Ramón Gutierrez from 07/23/18 re

## 2018-10-12 NOTE — TELEPHONE ENCOUNTER
As discussed at last OV should be seen for eval as not all pain is necessarily gout. Also we discussed daily controller medication for this but at last visit he declined.  OK to provide colchicine for today as this is weekend but I need to see patient next

## 2018-10-15 ENCOUNTER — CARDPULM VISIT (OUTPATIENT)
Dept: CARDIAC REHAB | Facility: HOSPITAL | Age: 53
End: 2018-10-15
Attending: INTERNAL MEDICINE
Payer: COMMERCIAL

## 2018-10-15 PROCEDURE — 93798 PHYS/QHP OP CAR RHAB W/ECG: CPT

## 2018-10-16 ENCOUNTER — OFFICE VISIT (OUTPATIENT)
Dept: INTERNAL MEDICINE CLINIC | Facility: CLINIC | Age: 53
End: 2018-10-16
Payer: COMMERCIAL

## 2018-10-16 VITALS
WEIGHT: 173.19 LBS | SYSTOLIC BLOOD PRESSURE: 122 MMHG | TEMPERATURE: 98 F | DIASTOLIC BLOOD PRESSURE: 78 MMHG | RESPIRATION RATE: 14 BRPM | OXYGEN SATURATION: 97 % | HEART RATE: 78 BPM | BODY MASS INDEX: 25.65 KG/M2 | HEIGHT: 69 IN

## 2018-10-16 DIAGNOSIS — Z12.11 SCREENING FOR COLON CANCER: ICD-10-CM

## 2018-10-16 DIAGNOSIS — Z23 NEED FOR VACCINATION: ICD-10-CM

## 2018-10-16 DIAGNOSIS — M79.671 ACUTE FOOT PAIN, RIGHT: Primary | ICD-10-CM

## 2018-10-16 PROCEDURE — 99213 OFFICE O/P EST LOW 20 MIN: CPT | Performed by: NURSE PRACTITIONER

## 2018-10-16 PROCEDURE — 90471 IMMUNIZATION ADMIN: CPT | Performed by: NURSE PRACTITIONER

## 2018-10-16 PROCEDURE — 90686 IIV4 VACC NO PRSV 0.5 ML IM: CPT | Performed by: NURSE PRACTITIONER

## 2018-10-16 NOTE — PROGRESS NOTES
Patient presents with: Follow - Up: f/u for gout foot pain      HPI:  Presents for follow up of right foot pain.  Called office 10/12/18 with c/o right foot pain and swelling just proximal to 1st and 2nd toes that he felt was similar to his prior attacks o (mitral valve prolapse)     Hypertriglyceridemia     Optic neuropathy     PAC (premature atrial contraction)     Status post insertion of drug eluting coronary artery stent     Essential hypertension     Dyslipidemia     Glucose intolerance        Current colon cancer- referred to Dr. Isabel Ordoñez for colonoscopy.      Orders Placed This Encounter      Flulaval 6 months and older 0.5 ml Quad PF [70058]      Meds & Refills for this Visit:  Requested Prescriptions      No prescriptions requested or ordered in this en

## 2018-10-17 ENCOUNTER — APPOINTMENT (OUTPATIENT)
Dept: CARDIAC REHAB | Facility: HOSPITAL | Age: 53
End: 2018-10-17
Attending: INTERNAL MEDICINE
Payer: COMMERCIAL

## 2018-10-17 PROCEDURE — 93798 PHYS/QHP OP CAR RHAB W/ECG: CPT

## 2018-10-19 ENCOUNTER — CARDPULM VISIT (OUTPATIENT)
Dept: CARDIAC REHAB | Facility: HOSPITAL | Age: 53
End: 2018-10-19
Attending: INTERNAL MEDICINE
Payer: COMMERCIAL

## 2018-10-19 PROCEDURE — 93798 PHYS/QHP OP CAR RHAB W/ECG: CPT

## 2018-10-22 ENCOUNTER — APPOINTMENT (OUTPATIENT)
Dept: CARDIAC REHAB | Facility: HOSPITAL | Age: 53
End: 2018-10-22
Attending: INTERNAL MEDICINE
Payer: COMMERCIAL

## 2018-10-24 ENCOUNTER — APPOINTMENT (OUTPATIENT)
Dept: CARDIAC REHAB | Facility: HOSPITAL | Age: 53
End: 2018-10-24
Attending: INTERNAL MEDICINE
Payer: COMMERCIAL

## 2018-10-31 PROBLEM — K64.1 GRADE II HEMORRHOIDS: Status: ACTIVE | Noted: 2018-10-31

## 2018-10-31 PROBLEM — Z79.02 ANTIPLATELET OR ANTITHROMBOTIC LONG-TERM USE: Status: ACTIVE | Noted: 2018-10-31

## 2018-10-31 RX ORDER — LISINOPRIL 5 MG/1
5 TABLET ORAL DAILY
Qty: 90 TABLET | Refills: 0 | Status: SHIPPED | OUTPATIENT
Start: 2018-10-31 | End: 2019-01-28

## 2019-01-29 RX ORDER — LISINOPRIL 5 MG/1
5 TABLET ORAL DAILY
Qty: 90 TABLET | Refills: 0 | Status: SHIPPED | OUTPATIENT
Start: 2019-01-29 | End: 2019-04-18

## 2019-04-19 ENCOUNTER — APPOINTMENT (OUTPATIENT)
Dept: LAB | Age: 54
End: 2019-04-19
Attending: INTERNAL MEDICINE
Payer: COMMERCIAL

## 2019-04-19 DIAGNOSIS — E78.2 MIXED HYPERLIPIDEMIA: ICD-10-CM

## 2019-04-19 DIAGNOSIS — Z95.5 STATUS POST INSERTION OF DRUG ELUTING CORONARY ARTERY STENT: ICD-10-CM

## 2019-04-19 DIAGNOSIS — I10 ESSENTIAL HYPERTENSION: ICD-10-CM

## 2019-04-19 PROCEDURE — 80061 LIPID PANEL: CPT

## 2019-04-19 PROCEDURE — 36415 COLL VENOUS BLD VENIPUNCTURE: CPT

## 2019-04-19 PROCEDURE — 80053 COMPREHEN METABOLIC PANEL: CPT

## 2021-04-12 DIAGNOSIS — Z23 NEED FOR VACCINATION: ICD-10-CM

## 2021-05-20 ENCOUNTER — TELEPHONE (OUTPATIENT)
Dept: INTERNAL MEDICINE CLINIC | Facility: CLINIC | Age: 56
End: 2021-05-20

## 2021-05-20 NOTE — TELEPHONE ENCOUNTER
----- Message from Mir Henry MD sent at 5/20/2021  2:31 PM CDT -----  Pt needs PE if I am to be his PCP  Has care gaps

## 2021-07-27 ENCOUNTER — OFFICE VISIT (OUTPATIENT)
Dept: INTERNAL MEDICINE CLINIC | Facility: CLINIC | Age: 56
End: 2021-07-27
Payer: COMMERCIAL

## 2021-07-27 VITALS
HEART RATE: 74 BPM | HEIGHT: 68.31 IN | BODY MASS INDEX: 26.67 KG/M2 | RESPIRATION RATE: 16 BRPM | WEIGHT: 178 LBS | OXYGEN SATURATION: 98 % | SYSTOLIC BLOOD PRESSURE: 120 MMHG | DIASTOLIC BLOOD PRESSURE: 76 MMHG | TEMPERATURE: 98 F

## 2021-07-27 DIAGNOSIS — I10 ESSENTIAL HYPERTENSION: ICD-10-CM

## 2021-07-27 DIAGNOSIS — Z95.5 STATUS POST INSERTION OF DRUG ELUTING CORONARY ARTERY STENT: ICD-10-CM

## 2021-07-27 DIAGNOSIS — Z12.11 COLON CANCER SCREENING: ICD-10-CM

## 2021-07-27 DIAGNOSIS — Z00.00 ROUTINE GENERAL MEDICAL EXAMINATION AT A HEALTH CARE FACILITY: Primary | ICD-10-CM

## 2021-07-27 DIAGNOSIS — Z23 NEED FOR VACCINATION: ICD-10-CM

## 2021-07-27 DIAGNOSIS — E74.39 GLUCOSE INTOLERANCE: ICD-10-CM

## 2021-07-27 DIAGNOSIS — E78.1 HYPERTRIGLYCERIDEMIA: ICD-10-CM

## 2021-07-27 PROBLEM — K64.1 GRADE II HEMORRHOIDS: Status: RESOLVED | Noted: 2018-10-31 | Resolved: 2021-07-27

## 2021-07-27 PROCEDURE — 99396 PREV VISIT EST AGE 40-64: CPT | Performed by: INTERNAL MEDICINE

## 2021-07-27 PROCEDURE — 90471 IMMUNIZATION ADMIN: CPT | Performed by: INTERNAL MEDICINE

## 2021-07-27 PROCEDURE — 3078F DIAST BP <80 MM HG: CPT | Performed by: INTERNAL MEDICINE

## 2021-07-27 PROCEDURE — 90715 TDAP VACCINE 7 YRS/> IM: CPT | Performed by: INTERNAL MEDICINE

## 2021-07-27 PROCEDURE — 3008F BODY MASS INDEX DOCD: CPT | Performed by: INTERNAL MEDICINE

## 2021-07-27 PROCEDURE — 90472 IMMUNIZATION ADMIN EACH ADD: CPT | Performed by: INTERNAL MEDICINE

## 2021-07-27 PROCEDURE — 3074F SYST BP LT 130 MM HG: CPT | Performed by: INTERNAL MEDICINE

## 2021-07-27 PROCEDURE — 90732 PPSV23 VACC 2 YRS+ SUBQ/IM: CPT | Performed by: INTERNAL MEDICINE

## 2021-07-27 NOTE — PROGRESS NOTES
Lucita Adkins is a 61year old male who presents for a complete physical exam.   HPI:   Pt complains of nothing, new pt to me.     Wt Readings from Last 6 Encounters:  07/27/21 : 178 lb (80.7 kg)  04/18/19 : 172 lb (78 kg)  10/30/18 : 173 lb 6.4 oz (78.7 rashes  EYES:denies visual disturbances, or blurred vision, eye discharge, redness or irritation  HEENT: denies nasal congestion, sinus pain , recurrent STs or swollen glands, hearing issues, tinnitus or vertigo  LUNGS: denies shortness of breath with exer tenderness  EXTREMITIES: no cyanosis, clubbing or edema, no varicosities or stasis change  NEURO: Oriented times three,cranial nerves 2-12 are intact,motor and sensory are grossly intact, gait normal          ASSESSMENT AND PLAN:   Carlitos Reich is a 61

## 2021-07-30 LAB
ABSOLUTE BASOPHILS: 47 CELLS/UL (ref 0–200)
ABSOLUTE EOSINOPHILS: 78 CELLS/UL (ref 15–500)
ABSOLUTE LYMPHOCYTES: 2090 CELLS/UL (ref 850–3900)
ABSOLUTE MONOCYTES: 562 CELLS/UL (ref 200–950)
ABSOLUTE NEUTROPHILS: 5023 CELLS/UL (ref 1500–7800)
ALBUMIN/GLOBULIN RATIO: 2.2 (CALC) (ref 1–2.5)
ALBUMIN: 4.4 G/DL (ref 3.6–5.1)
ALKALINE PHOSPHATASE: 51 U/L (ref 35–144)
ALT: 19 U/L (ref 9–46)
AST: 14 U/L (ref 10–35)
BASOPHILS: 0.6 %
BILIRUBIN, TOTAL: 1.5 MG/DL (ref 0.2–1.2)
BUN: 17 MG/DL (ref 7–25)
CALCIUM: 9.2 MG/DL (ref 8.6–10.3)
CARBON DIOXIDE: 29 MMOL/L (ref 20–32)
CHLORIDE: 103 MMOL/L (ref 98–110)
CHOL/HDLC RATIO: 4.8 (CALC)
CHOLESTEROL, TOTAL: 179 MG/DL
CREATININE: 0.92 MG/DL (ref 0.7–1.33)
EGFR IF AFRICN AM: 107 ML/MIN/1.73M2
EGFR IF NONAFRICN AM: 92 ML/MIN/1.73M2
EOSINOPHILS: 1 %
GLOBULIN: 2 G/DL (CALC) (ref 1.9–3.7)
GLUCOSE: 96 MG/DL (ref 65–99)
HDL CHOLESTEROL: 37 MG/DL
HEMATOCRIT: 46.8 % (ref 38.5–50)
HEMOGLOBIN A1C: 5.9 % OF TOTAL HGB
HEMOGLOBIN: 15.5 G/DL (ref 13.2–17.1)
LDL-CHOLESTEROL: 100 MG/DL (CALC)
LYMPHOCYTES: 26.8 %
MCH: 29 PG (ref 27–33)
MCHC: 33.1 G/DL (ref 32–36)
MCV: 87.5 FL (ref 80–100)
MONOCYTES: 7.2 %
MPV: 12.4 FL (ref 7.5–12.5)
NEUTROPHILS: 64.4 %
NON-HDL CHOLESTEROL: 142 MG/DL (CALC)
PLATELET COUNT: 186 THOUSAND/UL (ref 140–400)
POTASSIUM: 4 MMOL/L (ref 3.5–5.3)
PROTEIN, TOTAL: 6.4 G/DL (ref 6.1–8.1)
RDW: 12.5 % (ref 11–15)
RED BLOOD CELL COUNT: 5.35 MILLION/UL (ref 4.2–5.8)
SODIUM: 140 MMOL/L (ref 135–146)
TRIGLYCERIDES: 312 MG/DL
TSH W/REFLEX TO FT4: 1.76 MIU/L (ref 0.4–4.5)
WHITE BLOOD CELL COUNT: 7.8 THOUSAND/UL (ref 3.8–10.8)

## 2021-08-03 NOTE — PROGRESS NOTES
Spoke to pt. Made aware of results & recommendations. Pt voiced understanding.   Pt stated he is taking his fenofibrate every day; does not miss any dose  His dosage was recently increased due to coverage issue  See TE on 6/24/21    FYI to Dr. Ana Villasenor

## 2022-01-12 ENCOUNTER — TELEPHONE (OUTPATIENT)
Dept: INTERNAL MEDICINE CLINIC | Facility: CLINIC | Age: 57
End: 2022-01-12

## 2022-01-12 PROBLEM — F41.9 ANXIETY: Status: ACTIVE | Noted: 2022-01-12

## 2022-01-12 PROBLEM — R07.9 CHEST PAIN ON EXERTION: Status: ACTIVE | Noted: 2018-04-08

## 2022-01-12 PROBLEM — I20.0 UNSTABLE ANGINA (HCC): Status: ACTIVE | Noted: 2018-04-08

## 2022-01-12 PROBLEM — I16.0 HYPERTENSIVE URGENCY: Status: ACTIVE | Noted: 2018-04-08

## 2022-01-12 PROBLEM — E87.6 HYPOKALEMIA: Status: ACTIVE | Noted: 2018-04-08

## 2022-01-12 PROBLEM — I25.10 CAD IN NATIVE ARTERY: Status: ACTIVE | Noted: 2022-01-12

## 2024-03-25 ENCOUNTER — TELEPHONE (OUTPATIENT)
Dept: INTERNAL MEDICINE CLINIC | Facility: CLINIC | Age: 59
End: 2024-03-25

## 2024-03-25 NOTE — TELEPHONE ENCOUNTER
Spoke to patient. He will be out of town through May. I gave him Dr. Olson,  and Dr. Ba information. He will look over and call us back. Former Dr. Toth patient.

## 2024-08-06 ENCOUNTER — LAB ENCOUNTER (OUTPATIENT)
Dept: LAB | Age: 59
End: 2024-08-06
Attending: INTERNAL MEDICINE
Payer: COMMERCIAL

## 2024-08-06 DIAGNOSIS — I10 ESSENTIAL HYPERTENSION, MALIGNANT: ICD-10-CM

## 2024-08-06 DIAGNOSIS — E78.5 HYPERLIPEMIA: ICD-10-CM

## 2024-08-06 DIAGNOSIS — I49.1 SUPRAVENTRICULAR PREMATURE BEATS: ICD-10-CM

## 2024-08-06 DIAGNOSIS — I20.0 INTERMEDIATE CORONARY SYNDROME (HCC): ICD-10-CM

## 2024-08-06 DIAGNOSIS — E11.69 DIABETES MELLITUS ASSOCIATED WITH HORMONAL ETIOLOGY (HCC): ICD-10-CM

## 2024-08-06 DIAGNOSIS — Z95.5 STENTED CORONARY ARTERY: ICD-10-CM

## 2024-08-06 DIAGNOSIS — I25.10 CAD IN NATIVE ARTERY: Primary | ICD-10-CM

## 2024-08-06 DIAGNOSIS — I34.1 MITRAL VALVE PROLAPSE: ICD-10-CM

## 2024-08-06 DIAGNOSIS — R07.9 CHEST PAIN, UNSPECIFIED: ICD-10-CM

## 2024-08-06 LAB
ALBUMIN SERPL-MCNC: 5 G/DL (ref 3.2–4.8)
ALBUMIN/GLOB SERPL: 2.5 {RATIO} (ref 1–2)
ALP LIVER SERPL-CCNC: 57 U/L
ALT SERPL-CCNC: 24 U/L
ANION GAP SERPL CALC-SCNC: 4 MMOL/L (ref 0–18)
AST SERPL-CCNC: 18 U/L (ref ?–34)
BASOPHILS # BLD AUTO: 0.05 X10(3) UL (ref 0–0.2)
BASOPHILS NFR BLD AUTO: 0.6 %
BILIRUB SERPL-MCNC: 1.9 MG/DL (ref 0.2–1.1)
BUN BLD-MCNC: 16 MG/DL (ref 9–23)
CALCIUM BLD-MCNC: 9.8 MG/DL (ref 8.7–10.4)
CHLORIDE SERPL-SCNC: 105 MMOL/L (ref 98–112)
CHOLEST SERPL-MCNC: 159 MG/DL (ref ?–200)
CO2 SERPL-SCNC: 29 MMOL/L (ref 21–32)
CREAT BLD-MCNC: 1.14 MG/DL
EGFRCR SERPLBLD CKD-EPI 2021: 74 ML/MIN/1.73M2 (ref 60–?)
EOSINOPHIL # BLD AUTO: 0.06 X10(3) UL (ref 0–0.7)
EOSINOPHIL NFR BLD AUTO: 0.7 %
ERYTHROCYTE [DISTWIDTH] IN BLOOD BY AUTOMATED COUNT: 12.7 %
EST. AVERAGE GLUCOSE BLD GHB EST-MCNC: 137 MG/DL (ref 68–126)
FASTING PATIENT LIPID ANSWER: YES
FASTING STATUS PATIENT QL REPORTED: YES
GLOBULIN PLAS-MCNC: 2 G/DL (ref 2–3.5)
GLUCOSE BLD-MCNC: 124 MG/DL (ref 70–99)
HBA1C MFR BLD: 6.4 % (ref ?–5.7)
HCT VFR BLD AUTO: 50.4 %
HDLC SERPL-MCNC: 40 MG/DL (ref 40–59)
HGB BLD-MCNC: 17 G/DL
IMM GRANULOCYTES # BLD AUTO: 0.02 X10(3) UL (ref 0–1)
IMM GRANULOCYTES NFR BLD: 0.2 %
LDLC SERPL CALC-MCNC: 82 MG/DL (ref ?–100)
LYMPHOCYTES # BLD AUTO: 2.03 X10(3) UL (ref 1–4)
LYMPHOCYTES NFR BLD AUTO: 25.1 %
MCH RBC QN AUTO: 30.1 PG (ref 26–34)
MCHC RBC AUTO-ENTMCNC: 33.7 G/DL (ref 31–37)
MCV RBC AUTO: 89.2 FL
MONOCYTES # BLD AUTO: 0.53 X10(3) UL (ref 0.1–1)
MONOCYTES NFR BLD AUTO: 6.6 %
NEUTROPHILS # BLD AUTO: 5.4 X10 (3) UL (ref 1.5–7.7)
NEUTROPHILS # BLD AUTO: 5.4 X10(3) UL (ref 1.5–7.7)
NEUTROPHILS NFR BLD AUTO: 66.8 %
NONHDLC SERPL-MCNC: 119 MG/DL (ref ?–130)
OSMOLALITY SERPL CALC.SUM OF ELEC: 289 MOSM/KG (ref 275–295)
PLATELET # BLD AUTO: 195 10(3)UL (ref 150–450)
POTASSIUM SERPL-SCNC: 4.1 MMOL/L (ref 3.5–5.1)
PROT SERPL-MCNC: 7 G/DL (ref 5.7–8.2)
PSA SERPL-MCNC: 1.5 NG/ML (ref ?–4)
RBC # BLD AUTO: 5.65 X10(6)UL
SODIUM SERPL-SCNC: 138 MMOL/L (ref 136–145)
TRIGL SERPL-MCNC: 222 MG/DL (ref 30–149)
VLDLC SERPL CALC-MCNC: 35 MG/DL (ref 0–30)
WBC # BLD AUTO: 8.1 X10(3) UL (ref 4–11)

## 2024-08-06 PROCEDURE — 80061 LIPID PANEL: CPT

## 2024-08-06 PROCEDURE — 84153 ASSAY OF PSA TOTAL: CPT

## 2024-08-06 PROCEDURE — 85025 COMPLETE CBC W/AUTO DIFF WBC: CPT

## 2024-08-06 PROCEDURE — 80053 COMPREHEN METABOLIC PANEL: CPT

## 2024-08-06 PROCEDURE — 36415 COLL VENOUS BLD VENIPUNCTURE: CPT

## 2024-08-06 PROCEDURE — 83036 HEMOGLOBIN GLYCOSYLATED A1C: CPT

## 2025-01-24 PROBLEM — I16.0 HYPERTENSIVE URGENCY: Status: RESOLVED | Noted: 2018-04-08 | Resolved: 2025-01-24

## 2025-01-24 PROBLEM — E78.2 DYSLIPIDEMIA WITH LOW HIGH DENSITY LIPOPROTEIN (HDL) CHOLESTEROL WITH HYPERTRIGLYCERIDEMIA DUE TO TYPE 2 DIABETES MELLITUS (HCC): Status: ACTIVE | Noted: 2023-05-16

## 2025-01-24 PROBLEM — I10 HYPERTENSION: Status: ACTIVE | Noted: 2018-04-19

## 2025-01-24 PROBLEM — I49.1 PREMATURE ATRIAL CONTRACTION: Status: ACTIVE | Noted: 2023-05-16

## 2025-01-24 PROBLEM — E11.69 DYSLIPIDEMIA WITH LOW HIGH DENSITY LIPOPROTEIN (HDL) CHOLESTEROL WITH HYPERTRIGLYCERIDEMIA DUE TO TYPE 2 DIABETES MELLITUS (HCC): Status: ACTIVE | Noted: 2023-05-16

## 2025-01-24 PROBLEM — E87.6 HYPOKALEMIA: Status: RESOLVED | Noted: 2018-04-08 | Resolved: 2025-01-24

## 2025-01-24 NOTE — PROGRESS NOTES
Wellness Exam    CC: Patient is presenting for a wellness exam and to establish care.    HPI:   Current Complaints:   Follows with cardiology. Adherent to medication.   Last A1c was 6.4. Pt changed his diet. Decreased carb/sugar intake. Walks daily. Plays golf 5 days/wk.     Has not had gout flare up for the last 5 years. Flare ups used to be in the left great toe.     Sees ophthalmologist regularly. Last appointment was a month ago.     Pertinent Family History:   Family History   Problem Relation Age of Onset    Pacemaker Father     Heart Attack Father 58    Other (dementia) Mother 82    Other (ALS) Sister 57    Other (COPD) Brother 59        Last Health Maintenance Exam: unknown  Colonoscopy: Last colonoscopy polyp removed was precancerous. Recommendation was to repeat c-scope in 3 years.   Health Maintenance   Topic Date Due    Colorectal Cancer Screening  01/01/2025      PSA:    Health Maintenance   Topic Date Due    PSA  08/06/2026      Reported Health: good    Past Medical History:    Atherosclerosis of coronary artery    [facility] in [place], [place]    Blood in the stool    Blurred vision    Easy bruising    Fatigue    Gout    High blood pressure    High cholesterol    High triglycerides    high Tg    Hypersomnolence    Hypertriglyceridemia    Lipid screening    Lipid screening    MVP (mitral valve prolapse)    mild mitral valve prolapse w/out significant stenosis or regu    Non-restorative sleep    Optic neuritis    Optic neuropathy    hx anterior ischemic optic neuropathy    Other dysfunctions of sleep stages or arousal from sleep    PAC (premature atrial contraction)    1/22/2010: rare premature atrial contractions with one 5 beat run of paroxysmal supraventricular tachycardia    Papilledema    Paresthesia    Primary snoring    Routine general medical examination at a health care facility    Stented coronary artery    Stress    Visual disturbance     Past Surgical History:   Procedure Laterality Date     Angioplasty (coronary)  04/07/2018    STENTS X 2 RCA & Ramus     Other surgical history  2009    spinal tap    Other surgical history  04/2018    multiple stent placement     Social History     Socioeconomic History    Marital status:    Tobacco Use    Smoking status: Never    Smokeless tobacco: Never   Vaping Use    Vaping status: Never Used   Substance and Sexual Activity    Alcohol use: Yes     Comment: approx 0-5 drinks per month    Drug use: No   Other Topics Concern    Caffeine Concern Yes     Comment: \"not much\"    Exercise Yes     Comment: 2 x week, run/elliptical     Medications Ordered Prior to Encounter[1]    Review of Systems   Constitutional: Negative for fever, chills and fatigue.   HENT: Negative for hearing loss, congestion, sore throat and neck pain.    Eyes: Negative for pain and visual disturbance.   Respiratory: Negative for cough and shortness of breath.    Cardiovascular: Negative for chest pain and palpitations.   Gastrointestinal: Negative for nausea, vomiting, abdominal pain and diarrhea.   Genitourinary: Negative for urgency, frequency and difficulty urinating.   Musculoskeletal: Negative for arthralgias and gait problem.   Skin: Negative for color change and rash.   Neurological: Negative for tremors, weakness and numbness.   Hematological: Negative for adenopathy. Does not bruise/bleed easily.   Psychiatric/Behavioral: Negative for confusion and agitation. The patient is not nervous/anxious.      /80   Pulse 70   Temp 98 °F (36.7 °C)   Resp 14   Ht 5' 8.75\" (1.746 m)   Wt 170 lb (77.1 kg)   SpO2 99%   BMI 25.29 kg/m²   Physical Exam   Constitutional: He is oriented to person, place, and time. He appears well-developed. No distress.   HENT: Normocephalic and atraumatic. Nose normal. TMs pearly gray, + light reflex.  Mucous membranes moist, dentition normal.  Oropharynx without erythema, exudate or tonsillar hypertrophy  Eyes: EOM are normal. Pupils are equal, round, and  reactive to light. No scleral icterus. Fundoscopic exam: Red reflex b/l. No exudates, hemorrhages, a/v nicking, or papilledema seen b/l.  Neck: Normal range of motion. No thyromegaly present.   Cardiovascular: Normal rate, regular rhythm and normal heart sounds.  Exam reveals no friction rub, no murmur heard.  Pulmonary/Chest: Effort normal and breath sounds normal b/l. He has no wheezes or rales.   Abdominal: Soft. Bowel sounds are normal. There is no tenderness. No HSM.  Abdominal aorta normal in size, no hernia appreciated.  Musculoskeletal: Normal range of motion. He exhibits no edema.   Lymphadenopathy: He has no cervical or supraclavicular adenopathy.   : deferred  Neurological: He is alert and oriented to person, place, and time.  DTRs +2 and symmetric b/l.   Skin: Skin is warm. No rash noted. No erythema, pallor or jaundice.   Psychiatric: He has a normal mood and affect. His behavior is normal.   Bilateral barefoot skin diabetic exam is normal, visualized feet and the appearance is normal.  Bilateral monofilament/sensation of both feet is normal.  Pulsation pedal pulse exam of both lower legs/feet is normal as well.       Assessment and Plan:  Lawson Leavitt is a 59 year old male here for a wellness exam  Age appropriate cancer screening, labs, safety, immunizations were discussed with the patient and ordered as follows:    Lawson was seen today for establish care and physical.    Diagnoses and all orders for this visit:    Annual physical exam    Establishing care with new doctor, encounter for    Laboratory exam ordered as part of routine general medical examination  -     CBC With Differential With Platelet  -     Comp Metabolic Panel (14)  -     Lipid Panel  -     TSH W Reflex To Free T4  -     Urinalysis, Routine    Prostate cancer screening  -     PSA - Total [5363][Q]    Colon cancer screening  -     Gastro Referral - In Network    Encounter for vitamin deficiency screening  -     VITAMIN D,  25-HYDROXY [54667][Q]    Dyslipidemia with low high density lipoprotein (HDL) cholesterol with hypertriglyceridemia due to type 2 diabetes mellitus (HCC)  -     Microalb/Creat Ratio, Random Urine    Type 2 diabetes mellitus without complication, without long-term current use of insulin (HCC)  -     Hemoglobin A1C  -     Microalb/Creat Ratio, Random Urine   -will obtain ophthalmologist report  Acute drug-induced gout of right foot  -     Uric Acid    Influenza vaccine refused    Pt will consider pneumonia and Shingrix vaccine in future.      Orders Placed This Encounter   Procedures    CBC With Differential With Platelet    Comp Metabolic Panel (14)    Lipid Panel    TSH W Reflex To Free T4    Urinalysis, Routine    VITAMIN D, 25-HYDROXY [78568][Q]     Order Specific Question:   Release to patient     Answer:   Immediate    PSA - Total [5363][Q]     Order Specific Question:   Release to patient     Answer:   Immediate    Hemoglobin A1C    Microalb/Creat Ratio, Random Urine    Uric Acid     Order Specific Question:   Release to patient     Answer:   Immediate      Health Maintenance Due   Topic Date Due    Diabetes Care Dilated Eye Exam  Never done    Colorectal Cancer Screening  Never done    Annual Physical  Never done    Zoster Vaccines (1 of 2) Never done    Pneumococcal Vaccine: 50+ Years (2 of 2 - PCV) 07/27/2022    COVID-19 Vaccine (3 - 2024-25 season) 09/01/2024    Influenza Vaccine (1) 10/01/2024    Annual Depression Screening  01/01/2025    Diabetes Care: Foot Exam (Annual)  Never done    Diabetes Care: Microalb/Creat Ratio (Annual)  Never done    Diabetes Care A1C  02/06/2025        His 5 year prevention plan includes: annual physical and labs. 30 mins exercise most days of the week and heart healthy diet   Patient/Caregiver Education:  Patient/Caregiver Education: There are no barriers to learning. Medical education done.  Outcome: Patient verbalizes understanding.      Return in 12m for annual physical.  Return in 6m for f/u.  Educated by: AFUA Honeycutt           [1]   Current Outpatient Medications on File Prior to Visit   Medication Sig Dispense Refill    rosuvastatin 20 MG Oral Tab Take 1 tablet (20 mg total) by mouth nightly. 30 tablet 11    Fenofibrate 54 MG Oral Tab Take 1 tablet (54 mg total) by mouth daily. 90 tablet 3    metoprolol succinate 50 MG Oral Tablet 24 Hr Take 1 tablet (50 mg total) by mouth daily. 90 tablet 3    aspirin 81 MG Oral Chew Tab Chew by mouth daily.       No current facility-administered medications on file prior to visit.

## 2025-01-27 ENCOUNTER — PATIENT MESSAGE (OUTPATIENT)
Dept: INTERNAL MEDICINE CLINIC | Facility: CLINIC | Age: 60
End: 2025-01-27

## 2025-01-27 ENCOUNTER — OFFICE VISIT (OUTPATIENT)
Dept: INTERNAL MEDICINE CLINIC | Facility: CLINIC | Age: 60
End: 2025-01-27
Payer: COMMERCIAL

## 2025-01-27 VITALS
SYSTOLIC BLOOD PRESSURE: 130 MMHG | WEIGHT: 170 LBS | HEART RATE: 70 BPM | TEMPERATURE: 98 F | BODY MASS INDEX: 25.18 KG/M2 | DIASTOLIC BLOOD PRESSURE: 80 MMHG | HEIGHT: 68.75 IN | OXYGEN SATURATION: 99 % | RESPIRATION RATE: 14 BRPM

## 2025-01-27 DIAGNOSIS — E78.2 DYSLIPIDEMIA WITH LOW HIGH DENSITY LIPOPROTEIN (HDL) CHOLESTEROL WITH HYPERTRIGLYCERIDEMIA DUE TO TYPE 2 DIABETES MELLITUS (HCC): ICD-10-CM

## 2025-01-27 DIAGNOSIS — Z12.5 PROSTATE CANCER SCREENING: ICD-10-CM

## 2025-01-27 DIAGNOSIS — Z00.00 LABORATORY EXAM ORDERED AS PART OF ROUTINE GENERAL MEDICAL EXAMINATION: ICD-10-CM

## 2025-01-27 DIAGNOSIS — M10.271 ACUTE DRUG-INDUCED GOUT OF RIGHT FOOT: ICD-10-CM

## 2025-01-27 DIAGNOSIS — E11.69 DYSLIPIDEMIA WITH LOW HIGH DENSITY LIPOPROTEIN (HDL) CHOLESTEROL WITH HYPERTRIGLYCERIDEMIA DUE TO TYPE 2 DIABETES MELLITUS (HCC): ICD-10-CM

## 2025-01-27 DIAGNOSIS — E11.9 TYPE 2 DIABETES MELLITUS WITHOUT COMPLICATION, WITHOUT LONG-TERM CURRENT USE OF INSULIN (HCC): ICD-10-CM

## 2025-01-27 DIAGNOSIS — Z12.11 COLON CANCER SCREENING: ICD-10-CM

## 2025-01-27 DIAGNOSIS — Z76.89 ESTABLISHING CARE WITH NEW DOCTOR, ENCOUNTER FOR: ICD-10-CM

## 2025-01-27 DIAGNOSIS — Z28.21 INFLUENZA VACCINE REFUSED: ICD-10-CM

## 2025-01-27 DIAGNOSIS — E55.9 VITAMIN D DEFICIENCY: ICD-10-CM

## 2025-01-27 DIAGNOSIS — Z00.00 ANNUAL PHYSICAL EXAM: Primary | ICD-10-CM

## 2025-01-27 DIAGNOSIS — Z00.00 LABORATORY EXAM ORDERED AS PART OF ROUTINE GENERAL MEDICAL EXAMINATION: Primary | ICD-10-CM

## 2025-01-27 DIAGNOSIS — Z13.21 ENCOUNTER FOR VITAMIN DEFICIENCY SCREENING: ICD-10-CM

## 2025-01-27 PROBLEM — I49.1 PREMATURE ATRIAL CONTRACTION: Status: RESOLVED | Noted: 2023-05-16 | Resolved: 2025-01-27

## 2025-01-27 PROBLEM — R07.9 CHEST PAIN ON EXERTION: Status: RESOLVED | Noted: 2018-04-08 | Resolved: 2025-01-27

## 2025-01-27 PROBLEM — E74.39 GLUCOSE INTOLERANCE: Status: RESOLVED | Noted: 2018-07-25 | Resolved: 2025-01-27

## 2025-01-27 PROBLEM — F41.9 ANXIETY: Status: RESOLVED | Noted: 2022-01-12 | Resolved: 2025-01-27

## 2025-01-27 PROCEDURE — 99386 PREV VISIT NEW AGE 40-64: CPT

## 2025-01-27 NOTE — PATIENT INSTRUCTIONS
Recommendation vaccine is Prevnar 20 (pneumonia vaccine), Shingrix (shingles vaccine).  Please complete annual labs fasting (nothing to eat/drink 6-8 hrs except water or black coffee).

## 2025-02-03 ENCOUNTER — LAB ENCOUNTER (OUTPATIENT)
Dept: LAB | Age: 60
End: 2025-02-03
Payer: COMMERCIAL

## 2025-02-03 DIAGNOSIS — E78.2 DYSLIPIDEMIA WITH LOW HIGH DENSITY LIPOPROTEIN (HDL) CHOLESTEROL WITH HYPERTRIGLYCERIDEMIA DUE TO TYPE 2 DIABETES MELLITUS (HCC): ICD-10-CM

## 2025-02-03 DIAGNOSIS — Z12.5 PROSTATE CANCER SCREENING: ICD-10-CM

## 2025-02-03 DIAGNOSIS — E55.9 VITAMIN D DEFICIENCY: ICD-10-CM

## 2025-02-03 DIAGNOSIS — E11.9 TYPE 2 DIABETES MELLITUS WITHOUT COMPLICATION, WITHOUT LONG-TERM CURRENT USE OF INSULIN (HCC): ICD-10-CM

## 2025-02-03 DIAGNOSIS — E11.69 DYSLIPIDEMIA WITH LOW HIGH DENSITY LIPOPROTEIN (HDL) CHOLESTEROL WITH HYPERTRIGLYCERIDEMIA DUE TO TYPE 2 DIABETES MELLITUS (HCC): ICD-10-CM

## 2025-02-03 DIAGNOSIS — M10.271 ACUTE DRUG-INDUCED GOUT OF RIGHT FOOT: ICD-10-CM

## 2025-02-03 DIAGNOSIS — Z00.00 LABORATORY EXAM ORDERED AS PART OF ROUTINE GENERAL MEDICAL EXAMINATION: ICD-10-CM

## 2025-02-03 LAB
ALBUMIN SERPL-MCNC: 4.7 G/DL (ref 3.2–4.8)
ALBUMIN/GLOB SERPL: 2.1 {RATIO} (ref 1–2)
ALP LIVER SERPL-CCNC: 57 U/L
ALT SERPL-CCNC: 19 U/L
ANION GAP SERPL CALC-SCNC: 9 MMOL/L (ref 0–18)
AST SERPL-CCNC: 17 U/L (ref ?–34)
BASOPHILS # BLD AUTO: 0.05 X10(3) UL (ref 0–0.2)
BASOPHILS NFR BLD AUTO: 0.7 %
BILIRUB SERPL-MCNC: 1.5 MG/DL (ref 0.2–1.1)
BILIRUB UR QL STRIP.AUTO: NEGATIVE
BUN BLD-MCNC: 18 MG/DL (ref 9–23)
CALCIUM BLD-MCNC: 9.7 MG/DL (ref 8.7–10.6)
CHLORIDE SERPL-SCNC: 102 MMOL/L (ref 98–112)
CHOLEST SERPL-MCNC: 166 MG/DL (ref ?–200)
CO2 SERPL-SCNC: 29 MMOL/L (ref 21–32)
COLOR UR AUTO: YELLOW
COMPLEXED PSA SERPL-MCNC: 1.56 NG/ML (ref ?–4)
CREAT BLD-MCNC: 1.13 MG/DL
CREAT UR-SCNC: 186.2 MG/DL
EGFRCR SERPLBLD CKD-EPI 2021: 74 ML/MIN/1.73M2 (ref 60–?)
EOSINOPHIL # BLD AUTO: 0.06 X10(3) UL (ref 0–0.7)
EOSINOPHIL NFR BLD AUTO: 0.9 %
ERYTHROCYTE [DISTWIDTH] IN BLOOD BY AUTOMATED COUNT: 12.7 %
EST. AVERAGE GLUCOSE BLD GHB EST-MCNC: 137 MG/DL (ref 68–126)
FASTING PATIENT LIPID ANSWER: YES
FASTING STATUS PATIENT QL REPORTED: YES
GLOBULIN PLAS-MCNC: 2.2 G/DL (ref 2–3.5)
GLUCOSE BLD-MCNC: 135 MG/DL (ref 70–99)
GLUCOSE UR STRIP.AUTO-MCNC: NORMAL MG/DL
HBA1C MFR BLD: 6.4 % (ref ?–5.7)
HCT VFR BLD AUTO: 49.7 %
HDLC SERPL-MCNC: 42 MG/DL (ref 40–59)
HGB BLD-MCNC: 16.7 G/DL
IMM GRANULOCYTES # BLD AUTO: 0.03 X10(3) UL (ref 0–1)
IMM GRANULOCYTES NFR BLD: 0.4 %
KETONES UR STRIP.AUTO-MCNC: NEGATIVE MG/DL
LDLC SERPL CALC-MCNC: 89 MG/DL (ref ?–100)
LEUKOCYTE ESTERASE UR QL STRIP.AUTO: NEGATIVE
LYMPHOCYTES # BLD AUTO: 2.01 X10(3) UL (ref 1–4)
LYMPHOCYTES NFR BLD AUTO: 29.7 %
MCH RBC QN AUTO: 29.8 PG (ref 26–34)
MCHC RBC AUTO-ENTMCNC: 33.6 G/DL (ref 31–37)
MCV RBC AUTO: 88.6 FL
MICROALBUMIN UR-MCNC: <0.3 MG/DL
MONOCYTES # BLD AUTO: 0.47 X10(3) UL (ref 0.1–1)
MONOCYTES NFR BLD AUTO: 7 %
NEUTROPHILS # BLD AUTO: 4.14 X10 (3) UL (ref 1.5–7.7)
NEUTROPHILS # BLD AUTO: 4.14 X10(3) UL (ref 1.5–7.7)
NEUTROPHILS NFR BLD AUTO: 61.3 %
NITRITE UR QL STRIP.AUTO: NEGATIVE
NONHDLC SERPL-MCNC: 124 MG/DL (ref ?–130)
OSMOLALITY SERPL CALC.SUM OF ELEC: 294 MOSM/KG (ref 275–295)
PH UR STRIP.AUTO: 6.5 [PH] (ref 5–8)
PLATELET # BLD AUTO: 184 10(3)UL (ref 150–450)
POTASSIUM SERPL-SCNC: 3.9 MMOL/L (ref 3.5–5.1)
PROT SERPL-MCNC: 6.9 G/DL (ref 5.7–8.2)
PROT UR STRIP.AUTO-MCNC: NEGATIVE MG/DL
RBC # BLD AUTO: 5.61 X10(6)UL
RBC UR QL AUTO: NEGATIVE
SODIUM SERPL-SCNC: 140 MMOL/L (ref 136–145)
SP GR UR STRIP.AUTO: 1.02 (ref 1–1.03)
TRIGL SERPL-MCNC: 207 MG/DL (ref 30–149)
TSI SER-ACNC: 3.6 UIU/ML (ref 0.55–4.78)
URATE SERPL-MCNC: 7.3 MG/DL
UROBILINOGEN UR STRIP.AUTO-MCNC: NORMAL MG/DL
VIT D+METAB SERPL-MCNC: 26.2 NG/ML (ref 30–100)
VLDLC SERPL CALC-MCNC: 34 MG/DL (ref 0–30)
WBC # BLD AUTO: 6.8 X10(3) UL (ref 4–11)

## 2025-02-03 PROCEDURE — 84550 ASSAY OF BLOOD/URIC ACID: CPT

## 2025-02-03 PROCEDURE — 82043 UR ALBUMIN QUANTITATIVE: CPT

## 2025-02-03 PROCEDURE — 84443 ASSAY THYROID STIM HORMONE: CPT

## 2025-02-03 PROCEDURE — 80053 COMPREHEN METABOLIC PANEL: CPT

## 2025-02-03 PROCEDURE — 82306 VITAMIN D 25 HYDROXY: CPT

## 2025-02-03 PROCEDURE — 81001 URINALYSIS AUTO W/SCOPE: CPT

## 2025-02-03 PROCEDURE — 80061 LIPID PANEL: CPT

## 2025-02-03 PROCEDURE — 85025 COMPLETE CBC W/AUTO DIFF WBC: CPT

## 2025-02-03 PROCEDURE — 82570 ASSAY OF URINE CREATININE: CPT

## 2025-02-03 PROCEDURE — 83036 HEMOGLOBIN GLYCOSYLATED A1C: CPT

## 2025-02-03 PROCEDURE — 36415 COLL VENOUS BLD VENIPUNCTURE: CPT

## 2025-04-21 NOTE — PROGRESS NOTES
Lawson Leavitt is a 60 year old male.  HPI:   This visit is conducted using Telemedicine with live, interactive video and audio.     Patient consents to video visit today to discuss rash.  Rash  This is a new problem. The current episode started 1 to 4 weeks ago (X 10 days). The problem has been rapidly improving since onset. The affected locations include the torso. The rash is characterized by itchiness, redness and blistering. He was exposed to nothing. Pertinent negatives include no anorexia, congestion, cough, diarrhea, eye pain, facial edema, fatigue, fever, joint pain, nail changes, rhinorrhea, shortness of breath, sore throat or vomiting. Past treatments include anti-itch cream. The treatment provided significant relief.      Pt denies any new lesions, burning sensation. Has been applying calamine lotion which is helping with itchiness.  Current Medications[1]   Past Medical History[2]   Social History:  Short Social Hx on File[3]     REVIEW OF SYSTEMS:   GENERAL HEALTH: feels well otherwise. Denies fever, chills, unintentional weight change  SKIN: denies any unusual skin lesions, + rashes  RESPIRATORY: denies hemoptysis, shortness of breath with exertion, wheezing or cough  CARDIOVASCULAR: denies chest pain or palpitations, denies leg swelling  GI: denies abdominal pain and denies heartburn. Denies nausea, vomiting, diarrhea, constipation  NEURO: denies headaches, dizziness, weakness, syncope    EXAM:   No vitals for video visit  Physical Exam  - well appearing via video visit  - no visible rash or diaphoresis  - no respiratory distress or cough observed  - able to speak in full sentences  - alert and oriented    -vesicular/crusting few lesions on the erythematous base on the left torso.    ASSESSMENT AND PLAN:     Encounter Diagnosis   Name Primary?    Herpes zoster without complication Yes   -discussed it is a mild case of shingles. Considering calamine lotion helping with itchiness recommend to  continue. Advised to contact office if notes new lesions, starts to experience burning pain.  Advised to receive Shingrix vaccine.   Requested Prescriptions      No prescriptions requested or ordered in this encounter         The patient indicates understanding of these issues and agrees to the plan.  Lawson Leavitt understands phone evaluation is not a substitute for face-to-face examination or emergency care. Patient advised to go to ER or call 911 for worsening symptoms or acute distress.   Follow up if symptoms persist or worsen.         [1]   Current Outpatient Medications   Medication Sig Dispense Refill    rosuvastatin 20 MG Oral Tab Take 1 tablet (20 mg total) by mouth nightly. 30 tablet 11    Fenofibrate 54 MG Oral Tab Take 1 tablet (54 mg total) by mouth daily. 90 tablet 3    metoprolol succinate 50 MG Oral Tablet 24 Hr Take 1 tablet (50 mg total) by mouth daily. 90 tablet 3    aspirin 81 MG Oral Chew Tab Chew by mouth daily.     [2]   Past Medical History:   Atherosclerosis of coronary artery    [facility] in [place], [place]    Blood in the stool    Blurred vision    Easy bruising    Fatigue    Gout    High blood pressure    High cholesterol    High triglycerides    high Tg    Hypersomnolence    Hypertriglyceridemia    Lipid screening    Lipid screening    MVP (mitral valve prolapse)    mild mitral valve prolapse w/out significant stenosis or regu    Non-restorative sleep    Optic neuritis    Optic neuropathy    hx anterior ischemic optic neuropathy    Other dysfunctions of sleep stages or arousal from sleep    PAC (premature atrial contraction)    1/22/2010: rare premature atrial contractions with one 5 beat run of paroxysmal supraventricular tachycardia    Papilledema    Paresthesia    Primary snoring    Routine general medical examination at a health care facility    Stented coronary artery    Stress    Visual disturbance   [3]   Social History  Socioeconomic History    Marital status:    Tobacco  Use    Smoking status: Never    Smokeless tobacco: Never   Vaping Use    Vaping status: Never Used   Substance and Sexual Activity    Alcohol use: Yes     Comment: approx 0-5 drinks per month    Drug use: No   Other Topics Concern    Caffeine Concern Yes     Comment: \"not much\"    Exercise Yes     Comment: 2 x week, run/elliptical

## 2025-04-22 ENCOUNTER — TELEMEDICINE (OUTPATIENT)
Dept: INTERNAL MEDICINE CLINIC | Facility: CLINIC | Age: 60
End: 2025-04-22
Payer: COMMERCIAL

## 2025-04-22 DIAGNOSIS — B02.9 HERPES ZOSTER WITHOUT COMPLICATION: Primary | ICD-10-CM

## 2025-04-22 PROCEDURE — 98005 SYNCH AUDIO-VIDEO EST LOW 20: CPT

## 2025-07-19 ENCOUNTER — HOSPITAL ENCOUNTER (OUTPATIENT)
Age: 60
Discharge: HOME | End: 2025-07-19
Payer: COMMERCIAL

## 2025-07-19 VITALS
SYSTOLIC BLOOD PRESSURE: 150 MMHG | HEART RATE: 80 BPM | OXYGEN SATURATION: 99 % | DIASTOLIC BLOOD PRESSURE: 90 MMHG | TEMPERATURE: 98 F | RESPIRATION RATE: 18 BRPM

## 2025-07-19 DIAGNOSIS — L03.312 CELLULITIS OF BACK: Primary | ICD-10-CM

## 2025-07-19 RX ORDER — DOXYCYCLINE 100 MG/1
100 CAPSULE ORAL 2 TIMES DAILY
Qty: 14 CAPSULE | Refills: 0 | Status: SHIPPED | OUTPATIENT
Start: 2025-07-19 | End: 2025-07-26

## 2025-07-19 RX ORDER — MUPIROCIN 2 %
1 OINTMENT (GRAM) TOPICAL 2 TIMES DAILY
Qty: 1 G | Refills: 0 | Status: SHIPPED | OUTPATIENT
Start: 2025-07-19 | End: 2025-07-29

## 2025-07-19 NOTE — DISCHARGE INSTRUCTIONS
CHANGE DRESSING WITH APPLICATION OF CREAM 2X DAILY.     TAKE PROBIOTIC WITH MEDICATION.     READDRESS IN 2-3 DAYS IF WORSENING SYMPTOMS.

## 2025-07-19 NOTE — ED PROVIDER NOTES
Chief Complaint   Patient presents with    Rash Skin Problem       HPI:     Lawson Leavitt is a 60 year old male who presents for evaluation of rash along the mid back over the last 5 days.  Patient states developed rash along the right back without injury or trauma, notes assuming a lake last week without previous skin reaction complicated cellulitis or specific shingles evaluation.  Notes increased redness over the last few days applying topical hydrocortisone without improvement.  Denies associated fevers chills headache dizziness neck pain chest pain shortness of breath abdominal pain back pain upper extremity weakness or numbness.  Denies previous skin infections including MRSA.      PFSH    PFSH asessment screens reviewed and agree.  Nurses notes reviewed I agree with documentation.    Family History[1]  Family history reviewed with patient/caregiver and is not pertinent to presenting problem.  Social History     Socioeconomic History    Marital status:      Spouse name: Not on file    Number of children: Not on file    Years of education: Not on file    Highest education level: Not on file   Occupational History    Not on file   Tobacco Use    Smoking status: Never    Smokeless tobacco: Never   Vaping Use    Vaping status: Never Used   Substance and Sexual Activity    Alcohol use: Yes     Comment: approx 0-5 drinks per month    Drug use: No    Sexual activity: Not on file   Other Topics Concern     Service Not Asked    Blood Transfusions Not Asked    Caffeine Concern Yes     Comment: \"not much\"    Occupational Exposure Not Asked    Hobby Hazards Not Asked    Sleep Concern Not Asked    Stress Concern Not Asked    Weight Concern Not Asked    Special Diet Not Asked    Back Care Not Asked    Exercise Yes     Comment: 2 x week, run/elliptical    Bike Helmet Not Asked    Seat Belt Not Asked    Self-Exams Not Asked   Social History Narrative    Not on file     Social Drivers of Health     Food  Insecurity: Not on file   Transportation Needs: Not on file   Housing Stability: Not on file         ROS:   Positive for stated complaint: Rash back.  All other systems reviewed and negative except as noted above.  Constitutional and Vital Signs Reviewed.      Physical Exam:     Findings:    /90   Pulse 80   Temp 98.1 °F (36.7 °C) (Oral)   Resp 18   SpO2 99%   GENERAL: well developed, well nourished, well hydrated, no distress  SKIN: good skin turgor, erythematous eruption with warmth with superficial epidermal wounds along the right middle back x 2.  No induration no fluctuance no dehiscence, no vesicular pattern blanching or petechiae.  Erythema extends across the midline.  No crepitus.  EXTREMITIES: no cyanosis or edema. ALLISON without difficulty  GI: soft, non-tender, normal bowel sounds  HEAD: normocephalic, atraumatic  EYES: sclera non icteric bilateral, conjunctiva clear  NEURO: No focal deficits  PSYCH: Alert and oriented x3.  Answering questions appropriately.  Mood appropriate.    MDM/Assessment/Plan:   Orders for this encounter:    Orders Placed This Encounter    doxycycline 100 MG Oral Cap     Sig: Take 1 capsule (100 mg total) by mouth 2 (two) times daily for 7 days.     Dispense:  14 capsule     Refill:  0    mupirocin 2 % External Ointment     Sig: Apply 1 Application topically 2 (two) times daily for 10 days.     Dispense:  1 g     Refill:  0       Labs performed this visit:  No results found for this or any previous visit (from the past 10 hours).    MDM:  Patient exam suggestive of cellulitis agreeable to systemic as well as topical antibiotic and to readdress within 3 days for worsening or lingering issues of concerns.  Instructed on changes warranting emergent reevaluation, happy with plan of care, erythema marked upon disposition.        Diagnosis:    ICD-10-CM    1. Cellulitis of back  L03.312           All results reviewed and discussed with patient.  See AVS for detailed discharge  instructions for your condition today.    Follow Up with:  Jose Miller MD  [address] [place] [id number] [phone]    Schedule an appointment as soon as possible for a visit in 3 days  As needed, For wound re-check, If symptoms worsen         [1]   Family History  Problem Relation Age of Onset    Pacemaker Father     Heart Attack Father 58    Other (dementia) Mother 82    Other (ALS) Sister 57    Other (COPD) Brother 59

## 2025-07-19 NOTE — ED INITIAL ASSESSMENT (HPI)
Pt c/o red itchy rash to his mid back.  Pt states symptoms started about 4-5 days ago. Pt has been using OTC cortisone.

## (undated) NOTE — LETTER
08/31/21        Sterling Leavitt  [address]      Dear Norberto Frederick,    Our records indicate that you have outstanding lab. To schedule please call [employer] or visit their website.  Please request your results to be faxed to

## (undated) NOTE — ED AVS SNAPSHOT
Heather Chapman   MRN: [id number]    Department:  [facility] Emergency Department   Date of Visit:  9/19/2018           Disclosure     Insurance plans vary and the physician(s) referred by the ER may not be covered by your plan.  Please contact you tell this physician (or your personal doctor if your instructions are to return to your personal doctor) about any new or lasting problems. The primary care or specialist physician will see patients referred from the [facility] Emergency Department.  Daniella Sheppard